# Patient Record
Sex: FEMALE | Race: WHITE | URBAN - METROPOLITAN AREA
[De-identification: names, ages, dates, MRNs, and addresses within clinical notes are randomized per-mention and may not be internally consistent; named-entity substitution may affect disease eponyms.]

---

## 2017-02-20 ENCOUNTER — OFFICE VISIT (OUTPATIENT)
Dept: DERMATOLOGY | Age: 52
End: 2017-02-20

## 2017-02-20 DIAGNOSIS — D22.9 MULTIPLE NEVI: ICD-10-CM

## 2017-02-20 DIAGNOSIS — D48.5 NEOPLASM OF UNCERTAIN BEHAVIOR OF SKIN: ICD-10-CM

## 2017-02-20 DIAGNOSIS — L57.0 AK (ACTINIC KERATOSIS): ICD-10-CM

## 2017-02-20 DIAGNOSIS — Z85.828 HISTORY OF SKIN CANCER: Primary | ICD-10-CM

## 2017-02-20 PROCEDURE — 17000 DESTRUCT PREMALG LESION: CPT | Performed by: DERMATOLOGY

## 2017-02-20 PROCEDURE — 11100 PR BIOPSY OF SKIN LESION: CPT | Performed by: DERMATOLOGY

## 2017-02-20 PROCEDURE — 99213 OFFICE O/P EST LOW 20 MIN: CPT | Performed by: DERMATOLOGY

## 2017-02-20 RX ORDER — ESTRADIOL AND NORETHINDRONE ACETATE 1; .5 MG/1; MG/1
TABLET ORAL
COMMUNITY
Start: 2017-02-04 | End: 2017-12-28

## 2017-03-01 ENCOUNTER — TELEPHONE (OUTPATIENT)
Dept: DERMATOLOGY | Age: 52
End: 2017-03-01

## 2017-03-01 DIAGNOSIS — C44.91 BCC (BASAL CELL CARCINOMA): Primary | ICD-10-CM

## 2017-03-24 ENCOUNTER — OFFICE VISIT (OUTPATIENT)
Dept: INTERNAL MEDICINE CLINIC | Age: 52
End: 2017-03-24

## 2017-03-24 VITALS
TEMPERATURE: 98 F | DIASTOLIC BLOOD PRESSURE: 80 MMHG | SYSTOLIC BLOOD PRESSURE: 112 MMHG | WEIGHT: 146 LBS | HEART RATE: 76 BPM | HEIGHT: 62 IN | BODY MASS INDEX: 26.87 KG/M2

## 2017-03-24 DIAGNOSIS — H66.003 ACUTE SUPPURATIVE OTITIS MEDIA OF BOTH EARS WITHOUT SPONTANEOUS RUPTURE OF TYMPANIC MEMBRANES, RECURRENCE NOT SPECIFIED: Primary | ICD-10-CM

## 2017-03-24 PROCEDURE — 99213 OFFICE O/P EST LOW 20 MIN: CPT | Performed by: INTERNAL MEDICINE

## 2017-03-24 RX ORDER — AZITHROMYCIN 250 MG/1
TABLET, FILM COATED ORAL
Qty: 1 PACKET | Refills: 0 | Status: SHIPPED | OUTPATIENT
Start: 2017-03-24 | End: 2017-04-03

## 2017-03-27 ENCOUNTER — PROCEDURE VISIT (OUTPATIENT)
Dept: SURGERY | Age: 52
End: 2017-03-27

## 2017-03-27 VITALS
BODY MASS INDEX: 26.67 KG/M2 | TEMPERATURE: 97.9 F | OXYGEN SATURATION: 97 % | SYSTOLIC BLOOD PRESSURE: 133 MMHG | DIASTOLIC BLOOD PRESSURE: 88 MMHG | HEART RATE: 82 BPM | WEIGHT: 145.8 LBS

## 2017-03-27 DIAGNOSIS — C44.41 BCC (BASAL CELL CARCINOMA), SCALP/NECK: Primary | ICD-10-CM

## 2017-03-27 PROCEDURE — 13121 CMPLX RPR S/A/L 2.6-7.5 CM: CPT | Performed by: DERMATOLOGY

## 2017-03-27 PROCEDURE — 17312 MOHS ADDL STAGE: CPT | Performed by: DERMATOLOGY

## 2017-03-27 PROCEDURE — 17311 MOHS 1 STAGE H/N/HF/G: CPT | Performed by: DERMATOLOGY

## 2017-03-28 ENCOUNTER — TELEPHONE (OUTPATIENT)
Dept: SURGERY | Age: 52
End: 2017-03-28

## 2017-04-17 ENCOUNTER — NURSE ONLY (OUTPATIENT)
Dept: SURGERY | Age: 52
End: 2017-04-17

## 2017-04-17 DIAGNOSIS — Z48.02 VISIT FOR SUTURE REMOVAL: Primary | ICD-10-CM

## 2017-06-27 ENCOUNTER — OFFICE VISIT (OUTPATIENT)
Dept: INTERNAL MEDICINE CLINIC | Age: 52
End: 2017-06-27

## 2017-06-27 VITALS
HEART RATE: 64 BPM | DIASTOLIC BLOOD PRESSURE: 76 MMHG | HEIGHT: 62 IN | SYSTOLIC BLOOD PRESSURE: 120 MMHG | WEIGHT: 136 LBS | BODY MASS INDEX: 25.03 KG/M2

## 2017-06-27 DIAGNOSIS — I10 ESSENTIAL HYPERTENSION: Primary | ICD-10-CM

## 2017-06-27 PROCEDURE — 99213 OFFICE O/P EST LOW 20 MIN: CPT | Performed by: INTERNAL MEDICINE

## 2017-06-27 ASSESSMENT — PATIENT HEALTH QUESTIONNAIRE - PHQ9
1. LITTLE INTEREST OR PLEASURE IN DOING THINGS: 0
SUM OF ALL RESPONSES TO PHQ9 QUESTIONS 1 & 2: 0
2. FEELING DOWN, DEPRESSED OR HOPELESS: 0
SUM OF ALL RESPONSES TO PHQ QUESTIONS 1-9: 0

## 2017-10-23 ENCOUNTER — TELEPHONE (OUTPATIENT)
Dept: DERMATOLOGY | Age: 52
End: 2017-10-23

## 2017-10-26 ENCOUNTER — OFFICE VISIT (OUTPATIENT)
Dept: DERMATOLOGY | Age: 52
End: 2017-10-26

## 2017-10-26 ENCOUNTER — TELEPHONE (OUTPATIENT)
Dept: DERMATOLOGY | Age: 52
End: 2017-10-26

## 2017-10-26 DIAGNOSIS — L57.0 AK (ACTINIC KERATOSIS): ICD-10-CM

## 2017-10-26 DIAGNOSIS — D18.00 ANGIOMA: Primary | ICD-10-CM

## 2017-10-26 PROCEDURE — 17000 DESTRUCT PREMALG LESION: CPT | Performed by: DERMATOLOGY

## 2017-10-26 RX ORDER — VALACYCLOVIR HYDROCHLORIDE 1 G/1
2000 TABLET, FILM COATED ORAL PRN
Qty: 20 TABLET | Refills: 0 | Status: SHIPPED | OUTPATIENT
Start: 2017-10-26 | End: 2017-12-28

## 2017-10-26 NOTE — TELEPHONE ENCOUNTER
Pharm calling need directions on medication velacyclovir? for patient pls call back to discuss @ [phone]

## 2017-10-26 NOTE — TELEPHONE ENCOUNTER
Confirmed with [facility] that patient will take medication as needed and quantity is correct.

## 2017-12-28 ENCOUNTER — OFFICE VISIT (OUTPATIENT)
Dept: INTERNAL MEDICINE CLINIC | Age: 52
End: 2017-12-28

## 2017-12-28 VITALS
HEIGHT: 62 IN | WEIGHT: 129.4 LBS | HEART RATE: 76 BPM | BODY MASS INDEX: 23.81 KG/M2 | SYSTOLIC BLOOD PRESSURE: 114 MMHG | DIASTOLIC BLOOD PRESSURE: 76 MMHG

## 2017-12-28 DIAGNOSIS — E78.49 OTHER HYPERLIPIDEMIA: Primary | ICD-10-CM

## 2017-12-28 LAB
A/G RATIO: 1.8 (ref 1.1–2.2)
ALBUMIN SERPL-MCNC: 4.4 G/DL (ref 3.4–5)
ALP BLD-CCNC: 85 U/L (ref 40–129)
ALT SERPL-CCNC: 28 U/L (ref 10–40)
ANION GAP SERPL CALCULATED.3IONS-SCNC: 17 MMOL/L (ref 3–16)
AST SERPL-CCNC: 25 U/L (ref 15–37)
BILIRUB SERPL-MCNC: <0.2 MG/DL (ref 0–1)
BUN BLDV-MCNC: 16 MG/DL (ref 7–20)
CALCIUM SERPL-MCNC: 9.1 MG/DL (ref 8.3–10.6)
CHLORIDE BLD-SCNC: 105 MMOL/L (ref 99–110)
CHOLESTEROL, TOTAL: 226 MG/DL (ref 0–199)
CO2: 25 MMOL/L (ref 21–32)
CREAT SERPL-MCNC: 0.8 MG/DL (ref 0.6–1.1)
GFR AFRICAN AMERICAN: >60
GFR NON-AFRICAN AMERICAN: >60
GLOBULIN: 2.5 G/DL
GLUCOSE BLD-MCNC: 84 MG/DL (ref 70–99)
HDLC SERPL-MCNC: 60 MG/DL (ref 40–60)
LDL CHOLESTEROL CALCULATED: 150 MG/DL
POTASSIUM SERPL-SCNC: 4.2 MMOL/L (ref 3.5–5.1)
SODIUM BLD-SCNC: 147 MMOL/L (ref 136–145)
TOTAL PROTEIN: 6.9 G/DL (ref 6.4–8.2)
TRIGL SERPL-MCNC: 80 MG/DL (ref 0–150)
VLDLC SERPL CALC-MCNC: 16 MG/DL

## 2017-12-28 PROCEDURE — 99213 OFFICE O/P EST LOW 20 MIN: CPT | Performed by: INTERNAL MEDICINE

## 2017-12-28 NOTE — PROGRESS NOTES
Chief Complaint   Patient presents with    Hypertension    Discuss Labs     is fasting for [address] 46 y.o. female is here for follow-up of hyperlipidemia. She is watching her diet, she has stopped all prescription medications at this point in time. 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)    IBS (irritable bowel syndrome)     not medically treated    Skin cancer     Wears glasses     reading only           /76   Pulse 76   Ht 5' 2\" (1.575 m)   Wt 129 lb 6.4 oz (58.7 kg)   BMI 23.67 kg/m²     General Appearance:  Alert, cooperative, no distress, appears stated age   Head:  Normocephalic, without obvious abnormality, atraumatic   Neck: Supple, symmetrical, trachea midline, no adenopathy, thyroid: not enlarged, symmetric, no tenderness/mass/nodules, no carotid bruit or JVD   Lungs:   Clear to auscultation bilaterally, respirations unlabored   Chest Wall:  No tenderness or deformity   Heart:  Regular rate and rhythm, S1, S2 normal, no murmur, rub or gallop   Abdomen:   Soft, non-tender, bowel sounds active all four quadrants,  no masses, no organomegaly   Skin: Skin color, texture, turgor normal, no rashes or lesions   Lymph nodes: Cervical, supraclavicular  Adenopathy is absent   Neurologic: No focal neurological deficits noted       No components found for: CHLPL  Lab Results   Component Value Date    TRIG 101 12/27/2016    TRIG 135 07/14/2016    TRIG 111 07/26/2011     Lab Results   Component Value Date    HDL 53 12/27/2016    HDL 47 07/14/2016    HDL 62 07/26/2011     Lab Results   Component Value Date    LDLCALC 82 12/27/2016    LDLCALC 186 (H) 07/14/2016    LDLCALC 143 (H) 07/26/2011     Lab Results   Component Value Date    LABVLDL 20 12/27/2016    LABVLDL 27 07/14/2016     Lab Results   Component Value Date    CREATININE 0.8 12/27/2016       Impression and plan    1.  Other

## 2018-02-20 ENCOUNTER — OFFICE VISIT (OUTPATIENT)
Dept: DERMATOLOGY | Age: 53
End: 2018-02-20

## 2018-02-20 DIAGNOSIS — Z85.828 HISTORY OF NONMELANOMA SKIN CANCER: Primary | ICD-10-CM

## 2018-02-20 DIAGNOSIS — L57.0 AK (ACTINIC KERATOSIS): ICD-10-CM

## 2018-02-20 DIAGNOSIS — D18.00 ANGIOMA: ICD-10-CM

## 2018-02-20 DIAGNOSIS — D22.9 MULTIPLE NEVI: ICD-10-CM

## 2018-02-20 PROCEDURE — 99213 OFFICE O/P EST LOW 20 MIN: CPT | Performed by: DERMATOLOGY

## 2018-02-20 PROCEDURE — 17000 DESTRUCT PREMALG LESION: CPT | Performed by: DERMATOLOGY

## 2018-02-20 NOTE — PROGRESS NOTES
[facility] Dermatology  Woodie Snellen, MD  [address] [facility]  1965    48 y.o. female     Date of Visit: 2018    Chief Complaint: f/u skin cancer, lesions  Chief Complaint   Patient presents with    Skin Exam     Last seen:     for filler    History of Present Illness:    1. Hx of NMSC - here for full skin check,   s/p Mohs for lesion on the R cheek   S/p Mohs for [address] on the frontal scalp 3-2017  She has had no problems with previous sites and no new concerns. 2.  History of AK'sshe has a rough lesion on the L cheek/zygoma. 3. She has multiple asx pigmented lesions on the trunk and extremities, present for many years; no change in size/shape/color of any lesions; no bleeding lesions. 4. She notes a red lesion on the frontal scalp near her previous BCC. She had her first treatment with filler for her oral commissures and MM lines as well as the lips . She had no complications except for bruising and is very happy with improvement, remedios around her commissures/MM area. She might like to have her lips augmented more in the future. Hx of laser x 1 for asphalt tattoo on the upper lip with improvement. Hx of dermatitis on the chest - cleared with TAC. Review of Systems:  Gen: Feels well, good sense of health. Skin: No changing moles or lesions. Past Medical History, Family History, Surgical History, Medications and Allergies reviewed.     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)    IBS (irritable bowel syndrome)     not medically treated    Skin cancer     Wears glasses     reading only       Past Surgical History:   Procedure Laterality Date    BREAST ENHANCEMENT SURGERY Bilateral      SECTION      times 3    SKIN BIOPSY      basal cell removed    TUBAL LIGATION      WISDOM TOOTH EXTRACTION      WRIST FRACTURE SURGERY Left 1/22/15    Open reduction & internal fixation of Left distal radius fracture - 2 components & Screw Fixation of left scaphoid       No outpatient prescriptions have been marked as taking for the 2/20/18 encounter (Office Visit) with Marvel Oseguera MD.       Allergies   Allergen Reactions    Cephalexin Rash    Sulfa Antibiotics Rash         Physical Examination     Gen, NAD  The following were examined and determined to be normal: Psych/Neuro, Conjunctivae/eyelids, Gums/teeth/lips, Neck, Abdomen, RUE, LUE, RLE, LLE, Nails/digits and Groin/buttocks. and Breast/axilla/chest, back. Scalp   The following were examined and determined to be abnormal: face  R cheek scar and others clear  L zygoma with erythematous roughly scaled macule   trunk and extremities with scattered brown macules and papules - photo of the L breast and back in media  Frontal scalp with 2 tiny 1 mm red macules                Assessment and Plan     1. Hx of NMSC, no signs recurrence  - educ re ABCD's of MM   educ sun protection   encouraged skin check yearly (sooner if indicated), self checks     2. AK - L zygoma - 1  - lesion(s) on the above areas treated with liquid nitrogen x 2 cycles. Patient educated on risk of blister, hypopigmentation/scar and wound care. 3.  Benign-appearing nevi  - educ sun protection   - encouraged skin check yearly (sooner if indicated), self checks    4. Few tiny angiomas - frontal scalp  - reassured    S/p filler at previous visit - very happy with results and would like lips augmented more in the future.

## 2018-02-20 NOTE — PATIENT INSTRUCTIONS
reapply sunscreen about every 2 hours and after swimming or sweating. · Wear sun protective clothing. Swim shirts (aka. rash guards) are a great idea and negates the need to reapply sunscreen in those areas.      · Seek the shade whenever possible especially between the hours of 10 am and 4 pm when the suns rays are the strongest.     · Avoid tanning beds

## 2019-02-25 ENCOUNTER — OFFICE VISIT (OUTPATIENT)
Dept: DERMATOLOGY | Age: 54
End: 2019-02-25
Payer: COMMERCIAL

## 2019-02-25 DIAGNOSIS — Z85.828 HISTORY OF NONMELANOMA SKIN CANCER: Primary | ICD-10-CM

## 2019-02-25 DIAGNOSIS — D18.00 ANGIOMA: ICD-10-CM

## 2019-02-25 DIAGNOSIS — D22.9 MULTIPLE NEVI: ICD-10-CM

## 2019-02-25 DIAGNOSIS — Z87.2 HISTORY OF ACTINIC KERATOSES: ICD-10-CM

## 2019-02-25 PROCEDURE — 99213 OFFICE O/P EST LOW 20 MIN: CPT | Performed by: DERMATOLOGY

## 2020-01-21 ENCOUNTER — OFFICE VISIT (OUTPATIENT)
Dept: INTERNAL MEDICINE CLINIC | Age: 55
End: 2020-01-21
Payer: COMMERCIAL

## 2020-01-21 VITALS
BODY MASS INDEX: 24.84 KG/M2 | TEMPERATURE: 99.6 F | HEIGHT: 62 IN | WEIGHT: 135 LBS | SYSTOLIC BLOOD PRESSURE: 120 MMHG | HEART RATE: 64 BPM | DIASTOLIC BLOOD PRESSURE: 70 MMHG

## 2020-01-21 PROCEDURE — 99213 OFFICE O/P EST LOW 20 MIN: CPT | Performed by: INTERNAL MEDICINE

## 2020-01-21 RX ORDER — AZITHROMYCIN 250 MG/1
TABLET, FILM COATED ORAL
Qty: 1 PACKET | Refills: 0 | Status: SHIPPED | OUTPATIENT
Start: 2020-01-21 | End: 2020-01-31

## 2020-01-21 RX ORDER — VALACYCLOVIR HYDROCHLORIDE 1 G/1
2000 TABLET, FILM COATED ORAL PRN
Qty: 20 TABLET | Refills: 0 | Status: SHIPPED | OUTPATIENT
Start: 2020-01-21 | End: 2022-06-23

## 2020-02-25 ENCOUNTER — OFFICE VISIT (OUTPATIENT)
Dept: DERMATOLOGY | Age: 55
End: 2020-02-25
Payer: COMMERCIAL

## 2020-02-25 PROCEDURE — 99213 OFFICE O/P EST LOW 20 MIN: CPT | Performed by: DERMATOLOGY

## 2020-02-25 PROCEDURE — 11102 TANGNTL BX SKIN SINGLE LES: CPT | Performed by: DERMATOLOGY

## 2020-02-27 LAB — DERMATOLOGY PATHOLOGY REPORT: ABNORMAL

## 2020-03-05 ENCOUNTER — TELEPHONE (OUTPATIENT)
Dept: DERMATOLOGY | Age: 55
End: 2020-03-05

## 2020-06-09 ENCOUNTER — TELEPHONE (OUTPATIENT)
Dept: DERMATOLOGY | Age: 55
End: 2020-06-09

## 2020-06-22 ENCOUNTER — OFFICE VISIT (OUTPATIENT)
Dept: DERMATOLOGY | Age: 55
End: 2020-06-22
Payer: COMMERCIAL

## 2020-06-22 VITALS — TEMPERATURE: 98.1 F

## 2020-06-22 PROCEDURE — 17110 DESTRUCTION B9 LES UP TO 14: CPT | Performed by: DERMATOLOGY

## 2020-06-22 PROCEDURE — 11102 TANGNTL BX SKIN SINGLE LES: CPT | Performed by: DERMATOLOGY

## 2020-06-22 PROCEDURE — 17261 DSTRJ MAL LES T/A/L .6-1.0CM: CPT | Performed by: DERMATOLOGY

## 2020-06-22 NOTE — PATIENT INSTRUCTIONS
what you will do instead of smoking. Tell your family and friends that you are going to try to quit and on what date. Put together a list of phone numbers of friends and family members who can give you support when you feel you might break down and have a cigarette. Before your quit day, put all tobacco products, ashtrays, and lighters away. 3. Put your plan into action  When you wake up on your quit day, start using a nicotine replacement method if you had planned to do that. For the first few days after you quit, you may have some signs of nicotine withdrawal. You may feel restless and cranky. You may find it hard to think. You might need to change your dose of nicotine if these signs upset you. Do not smoke! If you feel like you want to smoke, call a friend or family member who has agreed to help you. Also, there might be someone in your doctor's office you can call. Put into action your plans for doing things other than smoking. For example, when you feel the urge to smoke, you might take a walk. Or, you might visit friends who do not smoke. It is best to stay away from places where you used to smoke. 4. If you return to smoking--  Quitting smoking is not easy. Many people have to try several times before they succeed. If you start smoking again, call your primary care doctor's office soon to talk about what happened. Think about what you can do to keep from smoking when you try to quit again. Part of this handout is provided by the [employer]. More information is available at the American Lung Association https://phillips.com/.  This information provides a general overview and may not apply to everyone. Talk to your primary care doctor to find out if this information applies to you and to get more information on this subject. Biopsy Wound Care Instructions    · Keep the bandage in place for 24 hours.    · Cleanse the wound with mild soapy water

## 2020-06-24 LAB — DERMATOLOGY PATHOLOGY REPORT: NORMAL

## 2020-06-25 ENCOUNTER — TELEPHONE (OUTPATIENT)
Dept: DERMATOLOGY | Age: 55
End: 2020-06-25

## 2020-10-12 NOTE — TELEPHONE ENCOUNTER
[place] has a new spot on her scalp about 3-4 inches away from where she had a bcc removed. She would like to have Dr. Soumya Oviedo take a look at it. [de-identified] : rt throat pain\par mass rt tonsil large\par here for biopsy

## 2020-12-04 ENCOUNTER — HOSPITAL ENCOUNTER (OUTPATIENT)
Dept: WOMENS IMAGING | Age: 55
Discharge: HOME | End: 2020-12-04
Payer: COMMERCIAL

## 2020-12-04 PROCEDURE — 77067 SCR MAMMO BI INCL CAD: CPT

## 2021-03-09 ENCOUNTER — OFFICE VISIT (OUTPATIENT)
Dept: DERMATOLOGY | Age: 56
End: 2021-03-09
Payer: COMMERCIAL

## 2021-03-09 VITALS — TEMPERATURE: 97.6 F

## 2021-03-09 DIAGNOSIS — Z85.828 HISTORY OF NONMELANOMA SKIN CANCER: Primary | ICD-10-CM

## 2021-03-09 DIAGNOSIS — L57.0 AK (ACTINIC KERATOSIS): ICD-10-CM

## 2021-03-09 DIAGNOSIS — D22.9 MULTIPLE NEVI: ICD-10-CM

## 2021-03-09 DIAGNOSIS — D18.01 HEMANGIOMA OF SKIN: ICD-10-CM

## 2021-03-09 PROCEDURE — 99213 OFFICE O/P EST LOW 20 MIN: CPT | Performed by: DERMATOLOGY

## 2021-03-09 PROCEDURE — 17003 DESTRUCT PREMALG LES 2-14: CPT | Performed by: DERMATOLOGY

## 2021-03-09 PROCEDURE — 17000 DESTRUCT PREMALG LESION: CPT | Performed by: DERMATOLOGY

## 2021-03-09 NOTE — PROGRESS NOTES
[address] [facility] Dermatology  Elle Ruelas MD  [address] [facility]  1965    64 y.o. female     Date of Visit: 3/9/2021    Chief Complaint: f/u skin cancer, lesions  Chief Complaint   Patient presents with    Skin Lesion     FSE        HX:NMSC     Last seen:  6-2020   for filler    History of Present Illness:    1. Hx of NMSC - here for full skin check,   s/p Mohs for lesion on the R cheek   S/p Mohs for [address] on the frontal scalp 3-2017  L lateral lower shin - sup BCC -curettage 2020  She has had no problems with previous sites and no new concerns. 2.  History of AK's - few new concerns on the face and chest.    3. She has multiple asx pigmented lesions on the trunk and extremities, present for many years; no change in size/shape/color of any lesions; no bleeding lesions. 4. She has a few red lesions on the frontal scalp near her previous BCC. She had her first treatment with filler for her oral commissures and MM lines as well as the lips . She had no complications except for bruising and was very happy with improvement, remedios around her commissures/MM area. She might like to have her lips augmented more in the future. Hx of laser x 1 for asphalt tattoo on the upper lip with improvement. Hx of dermatitis on the chest - cleared with TAC. Review of Systems:  Gen: Feels well, good sense of health. Skin: No changing moles or lesions. Past Medical History, Family History, Surgical History, Medications and Allergies reviewed.     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)    IBS (irritable bowel syndrome)     not medically treated    Skin cancer     Wears glasses     reading only       Past Surgical History:   Procedure Laterality Date    BREAST ENHANCEMENT SURGERY Bilateral      SECTION      times 3    SKIN BIOPSY      basal cell removed    TUBAL LIGATION      WISDOM TOOTH EXTRACTION      WRIST FRACTURE SURGERY Left 1/22/15    Open reduction & internal fixation of Left distal radius fracture - 2 components & Screw Fixation of left scaphoid       Outpatient Medications Marked as Taking for the 3/9/21 encounter (Office Visit) with Miri Pabon MD   Medication Sig Dispense Refill    valACYclovir (VALTREX) 1 g tablet Take 2 tablets by mouth as needed (Take 2 tablets and 2 more tablets 12 hours later at start of cold sore) 20 tablet 0       Allergies   Allergen Reactions    Z-Jacob [Azithromycin] Hives and Itching    Cephalexin Rash    Sulfa Antibiotics Rash         Physical Examination     Gen, NAD  The following were examined and determined to be normal: Psych/Neuro, Conjunctivae/eyelids, Gums/teeth/lips, Neck, Abdomen, RUE, LUE, RLE, LLE, Nails/digits and Groin/buttocks. and Breast/axilla/chest, back. Scalp   The following were examined and determined to be abnormal: face  R cheek scar and others clear  Chest and face with erythematous roughly scaled macules  trunk and extremities with scattered brown macules and papules - photo of the L breast and back in media - stable  Frontal scalp with tiny 1 mm red macule  Scars clear                Assessment and Plan     1. Hx of NMSC, no signs recurrence  - educ re si/sx/ABCD's of    educ sun protection - OTC sunscreen with SPF 30-50+ recommended and reviewed usage  encouraged skin check yearly (sooner if indicated), self checks    2. AK - chest x 2 and face x 4  - 6 lesion(s) treated with liquid nitrogen x 2 cycles. Patient educated on risk of blister, hypopigmentation/scar and wound care. - cont sun protection    3.   Benign-appearing nevi  - educ re si/sx/ABCD's of MM   educ sun protection - OTC sunscreen with SPF 30-50+ recommended and reviewed usage  encouraged skin check yearly (sooner if indicated), self checks    4. tiny angiomas - frontal scalp  - reassured       S/p filler at previous visit - very happy with results and would like lips augmented more in the future. 500 -1000 depending on if 1 or 2 syringes for lip lines and MM.

## 2021-03-09 NOTE — PATIENT INSTRUCTIONS
For your well being, we encourage you to stop smoking or using tobacco products. Smoking and the use of other tobacco products, including cigars and smokeless tobacco, causes or worsens numerous diseases and conditions. Some products also expose nearby people to toxic secondhand smoke. Smoking is the leading cause of preventable death in the [place] causing over 438,000 deaths per year. Secondhand smoke is a serious health hazard for people of all ages, causing more than 41,000 deaths each year. Marijuana smoke contains many of the same toxins, irritants and carcinogens as tobacco smoke. Electronic cigarettes are a new tobacco product, and the potential health consequences and safety of these products are unknown. Smokeless Tobacco products are a known cause of cancer, and are not a safe alternative to cigarettes. 1. Make the decision to quit smoking  Stopping smoking is the best thing you can do for your health. If you smoke, you are more likely to get diseases of the lungs, heart, and brain. You are also more likely to get many kinds of cancer. After you quit, you will be less likely to get these diseases. Stopping smoking is hard--but you can do it! Your doctor can help you. 2. Get ready to quit  Once you decide to quit, make a plan with the help of your doctor. Here are some things you need to do:  Choose a day to quit. Talk to your primary care doctor about using the nicotine patch, gum, inhaler, or nasal spray to help you quit smoking. Getting nicotine some way other than in a cigarette can help make quitting easier. Talk to your primary care doctor about using a prescription medicine like bupropion (brand name: Zyban) to reduce your urge to smoke. If you decide to use a medicine, start taking it two weeks before your quit day. Talk with your primary care doctor about when you smoke. For example, you may smoke first thing in the morning, after a meal, or when you feel stressed.  Plan what you will do instead of smoking. Tell your family and friends that you are going to try to quit and on what date. Put together a list of phone numbers of friends and family members who can give you support when you feel you might break down and have a cigarette. Before your quit day, put all tobacco products, ashtrays, and lighters away. 3. Put your plan into action  When you wake up on your quit day, start using a nicotine replacement method if you had planned to do that. For the first few days after you quit, you may have some signs of nicotine withdrawal. You may feel restless and cranky. You may find it hard to think. You might need to change your dose of nicotine if these signs upset you. Do not smoke! If you feel like you want to smoke, call a friend or family member who has agreed to help you. Also, there might be someone in your doctor's office you can call. Put into action your plans for doing things other than smoking. For example, when you feel the urge to smoke, you might take a walk. Or, you might visit friends who do not smoke. It is best to stay away from places where you used to smoke. 4. If you return to smoking--  Quitting smoking is not easy. Many people have to try several times before they succeed. If you start smoking again, call your primary care doctor's office soon to talk about what happened. Think about what you can do to keep from smoking when you try to quit again. Part of this handout is provided by the [employer]. More information is available at the American Lung Association https://phillips.com/.  This information provides a general overview and may not apply to everyone. Talk to your primary care doctor to find out if this information applies to you and to get more information on this subject.       Cryosurgery (Freezing) Wound Care Instructions    AFTER THE PROCEDURE:    You will notice swelling and redness around the site. This is normal.    You may experience a sharp or sore feeling for the next several days. For this discomfort, you may take acetaminophen (Tylenol©).  A blister may develop at the treated area, sometimes as soon as by the end of the day. After several days, the blister will subside and a scab will form.  If the area is bumped or traumatized during the first few days following freezing, you may develop bleeding into the blister, forming a blood blister. This is nothing to be alarmed about.  If the blister is tense, uncomfortable, or much larger than the site that was frozen, you may pop the blister along its edge with a sterile needle (boiled, heated under a flame, or cleaned with alcohol) to allow the fluid to drain out. If the blister does not bother you, no treatment is needed.  Do NOT peel off the top of the blister roof. It will act as a dressing on top of your wound. WOUND CARE:    You may shower or bathe as usual, but avoid scrubbing the areas that have been frozen.  Cleanse the site twice a day with mild soapy water, and then apply a thin film of white petrolatum (Vaseline©).  You do not need to cover the area, but can if you prefer.  Do NOT allow the site to become dry or crusted, or attempt to dry it out with rubbing alcohol or hydrogen peroxide.  Continue this regimen until the area is pink and healed. Depending on the size and location of your cryosurgery site, healing may take 2 to 4 weeks.  The area may continue to be pink for several weeks, and over the next few months may become darker or lighter than the surrounding skin. This may be a permanent change. Protecting Yourself From the Sun    · Apply an over-the-counter broad spectrum water resistant sunscreen with an SPF of at least 30 to exposed areas of the skin. Dont forget the ears and lips! Remember to reapply sunscreen about every 2 hours and after swimming or sweating. · Wear sun protective clothing. Swim shirts (aka. rash guards) are a great idea and negates the need to reapply sunscreen in those areas.      · Seek the shade whenever possible especially between the hours of 10 am and 4 pm when the suns rays are the strongest.     · Avoid tanning beds  ·

## 2021-03-17 ENCOUNTER — TELEPHONE (OUTPATIENT)
Dept: INTERNAL MEDICINE CLINIC | Age: 56
End: 2021-03-17

## 2021-03-17 ENCOUNTER — NURSE TRIAGE (OUTPATIENT)
Dept: OTHER | Facility: CLINIC | Age: 56
End: 2021-03-17

## 2021-03-17 ENCOUNTER — VIRTUAL VISIT (OUTPATIENT)
Dept: INTERNAL MEDICINE CLINIC | Age: 56
End: 2021-03-17
Payer: COMMERCIAL

## 2021-03-17 DIAGNOSIS — J02.9 SORE THROAT: Primary | ICD-10-CM

## 2021-03-17 PROCEDURE — 87880 STREP A ASSAY W/OPTIC: CPT | Performed by: NURSE PRACTITIONER

## 2021-03-17 PROCEDURE — 99213 OFFICE O/P EST LOW 20 MIN: CPT | Performed by: NURSE PRACTITIONER

## 2021-03-17 ASSESSMENT — ENCOUNTER SYMPTOMS
WHEEZING: 0
RHINORRHEA: 1
SHORTNESS OF BREATH: 0
TROUBLE SWALLOWING: 0
SINUS PRESSURE: 0
DIARRHEA: 0
NAUSEA: 0
CONSTIPATION: 0
VOMITING: 0
ABDOMINAL PAIN: 0
SORE THROAT: 1
EYE PAIN: 0
COUGH: 0

## 2021-03-17 NOTE — TELEPHONE ENCOUNTER
Reason for Disposition   Earache also present    Answer Assessment - Initial Assessment Questions  1. ONSET: \"When did the throat start hurting? \" (Hours or days ago)       This morning    2. SEVERITY: \"How bad is the sore throat? \" (Scale 1-10; mild, moderate or severe)    - MILD (1-3):  doesn't interfere with eating or normal activities    - MODERATE (4-7): interferes with eating some solids and normal activities    - SEVERE (8-10):  excruciating pain, interferes with most normal activities    - SEVERE DYSPHAGIA: can't swallow liquids, drooling      Moderate    3. STREP EXPOSURE: \"Has there been any exposure to strep within the past week? \" If so, ask: \"What type of contact occurred? \"       No    4. VIRAL SYMPTOMS: Lou Gram there any symptoms of a cold, such as a runny nose, cough, hoarse voice or red eyes? \"   Ear congestion , runny nose, tickle in throat, hoarse    5. FEVER: \"Do you have a fever? \" If so, ask: \"What is your temperature, how was it measured, and when did it start? \"  No    6. PUS ON THE TONSILS: \"Is there pus on the tonsils in the back of your throat? \"  No    7. OTHER SYMPTOMS: \"Do you have any other symptoms? \" (e.g., difficulty breathing, headache, rash)  No    Protocols used: SORE THROAT-ADULT-[place]    Patient called Italo Luu at [facility])  with red flag complaint. Brief description of triage: sore throat    Triage indicates for patient to be seen today. If no appts available instructed to be seen at local urgent care. Care advice provided, patient verbalizes understanding; denies any other questions or concerns; instructed to call back for any new or worsening symptoms. Writer provided warm transfer to Jean Paul Franco at [facility] for appointment scheduling. Attention Provider: Thank you for allowing me to participate in the care of your patient. The patient was connected to triage in response to information provided to the [facility].   Please do not respond through this encounter as the response is not directed to a shared pool.

## 2021-03-17 NOTE — TELEPHONE ENCOUNTER
I can provide an order for a normal COVID test. According to the most recent information, rapid [facility] tests are being held for immediate need cases such as pre-op or admissions.

## 2021-03-17 NOTE — PROGRESS NOTES
TELEHEALTH EVALUATION -- Audio/Visual (During CSPIV-76 public health emergency)  Acute Office Visit  3/17/2021    SUBJECTIVE:    Patient ID: Juanita Rodriguez is a 64 y.o. female. Chief Complaint   Patient presents with    Pharyngitis     HPI: The patient presents for an acute visit. Pt reports a sore throat and ear pain that have been present since this morning. States she has a history of a right large tonsil. No history of strep throat. Started with nasal drainage yesterday. 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 Denies fevers or chills. Denies N/V, diarrhea or abdominal pain. Denies CP, SOB or wheezing. No cough. No trouble breathing. Treatment tried and response - tylenol with some relief  Recent ill contacts? Works as a teacher  Tobacco use or second hand smoke exposure - smoker  Condition better, worsening, or stable - stable    Allergies   Allergen Reactions    Z-Jacob [Azithromycin] Hives and Itching    Cephalexin Rash    Sulfa Antibiotics Rash     Current Outpatient Medications   Medication Sig Dispense Refill    valACYclovir (VALTREX) 1 g tablet Take 2 tablets by mouth as needed (Take 2 tablets and 2 more tablets 12 hours later at start of cold sore) 20 tablet 0     No current facility-administered medications for this visit. Review of Systems   Constitutional: Negative for appetite change, chills, diaphoresis, fatigue and fever. HENT: Positive for ear pain, rhinorrhea and sore throat. Negative for congestion, drooling, ear discharge, hearing loss, postnasal drip, sinus pressure, sneezing and trouble swallowing. Eyes: Negative for pain and visual disturbance. Respiratory: Negative for cough, shortness of breath and wheezing. Cardiovascular: Negative for chest pain and palpitations. Gastrointestinal: Negative for abdominal pain, constipation, diarrhea, nausea and vomiting. Skin: Negative for pallor.    Neurological: Negative for dizziness, light-headedness and headaches. OBJECTIVE:  Video Virtual Visit  Patient-Reported Vitals 3/17/2021   Patient-Reported Weight 140 lb   Patient-Reported Height 5'2''   Patient-Reported Temperature 96.9    ^no access to other VS d/t VV per pt. Physical Exam  Constitutional:       General: She is not in acute distress. Appearance: Normal appearance. She is not ill-appearing. Pulmonary:      Effort: Pulmonary effort is normal. No respiratory distress. Skin:     Coloration: Skin is not jaundiced or pale. Neurological:      Mental Status: She is alert. Comments: No facial asymmetry noted   Psychiatric:         Mood and Affect: Mood normal.      Due to this being a TeleHealth encounter, evaluation of the following organ systems is limited: Vitals/Constitutional/EENT/Resp/CV/GI//MS/Neuro/Skin/Heme-Lymph-Imm. ASSESSMENT/PLAN:  Rafael Hickey was seen today for pharyngitis. Diagnoses and all orders for this visit:    Sore throat  -     CDC guidelines reviewed. Recommend strep and Covid testing. Patient agreeable  - POCT rapid strep A  - Covid 19 testing  - MA scheduling pt at flu clinic.  - Quarantine recommended  - Symptomatic management reviewed. - Recommend patient call if symptoms worsen or fail to improve  - Red flag warning signs reviewed with the patient and she will go to the ER if these occur    Return for as previously scheduled or sooner if needed. Mariam Hay is a 64 y.o. female being evaluated by a Virtual Visit (video visit) encounter to address concerns as mentioned above. A caregiver was present when appropriate. Due to this being a TeleHealth encounter (During SINRI-43 public health emergency), evaluation of the following organ systems was limited: Vitals/Constitutional/EENT/Resp/CV/GI//MS/Neuro/Skin/Heme-Lymph-Imm.   Pursuant to the emergency declaration under the [facility] [place], [address] and

## 2021-03-17 NOTE — TELEPHONE ENCOUNTER
Patient states she went to [facility] and had covid 19 test & strep test and both are negative. She states the physician at urgent care is prescribing amoxicillin and steroid for her.

## 2021-03-17 NOTE — TELEPHONE ENCOUNTER
----- Message from [place] [employer] sent at 3/17/2021 11:36 AM EDT -----  Subject: Message to Provider    QUESTIONS  Information for Provider? Pt would like for the office to give her a call. Pt is in need of a rapid covid test. Pt just got off a with provider. Pt   needs a order for a rapid covid test.  ---------------------------------------------------------------------------  --------------  CALL BACK INFO  What is the best way for the office to contact you? OK to leave message on   voicemail  Preferred Call Back Phone Number? [phone]  ---------------------------------------------------------------------------  --------------  SCRIPT ANSWERS  Relationship to Patient?  Self

## 2021-03-24 ENCOUNTER — OFFICE VISIT (OUTPATIENT)
Dept: INTERNAL MEDICINE CLINIC | Age: 56
End: 2021-03-24
Payer: COMMERCIAL

## 2021-03-24 VITALS
TEMPERATURE: 97.2 F | DIASTOLIC BLOOD PRESSURE: 62 MMHG | BODY MASS INDEX: 26.5 KG/M2 | OXYGEN SATURATION: 97 % | SYSTOLIC BLOOD PRESSURE: 118 MMHG | WEIGHT: 144 LBS | HEART RATE: 88 BPM | HEIGHT: 62 IN

## 2021-03-24 DIAGNOSIS — Z76.89 ENCOUNTER TO ESTABLISH CARE WITH NEW DOCTOR: Primary | ICD-10-CM

## 2021-03-24 DIAGNOSIS — Z11.4 SCREENING FOR HIV (HUMAN IMMUNODEFICIENCY VIRUS): ICD-10-CM

## 2021-03-24 DIAGNOSIS — Z23 NEED FOR SHINGLES VACCINE: ICD-10-CM

## 2021-03-24 DIAGNOSIS — F17.200 SMOKER: ICD-10-CM

## 2021-03-24 DIAGNOSIS — E78.49 OTHER HYPERLIPIDEMIA: ICD-10-CM

## 2021-03-24 DIAGNOSIS — B00.1 COLD SORE: ICD-10-CM

## 2021-03-24 DIAGNOSIS — Z11.59 NEED FOR HEPATITIS C SCREENING TEST: ICD-10-CM

## 2021-03-24 PROCEDURE — 90471 IMMUNIZATION ADMIN: CPT | Performed by: NURSE PRACTITIONER

## 2021-03-24 PROCEDURE — 90750 HZV VACC RECOMBINANT IM: CPT | Performed by: NURSE PRACTITIONER

## 2021-03-24 PROCEDURE — 99396 PREV VISIT EST AGE 40-64: CPT | Performed by: NURSE PRACTITIONER

## 2021-03-24 RX ORDER — VARENICLINE TARTRATE
KIT
Qty: 1 BOX | Refills: 0 | Status: SHIPPED | OUTPATIENT
Start: 2021-03-24 | End: 2021-04-28

## 2021-03-24 SDOH — HEALTH STABILITY: MENTAL HEALTH: HOW OFTEN DO YOU HAVE A DRINK CONTAINING ALCOHOL?: NOT ASKED

## 2021-03-24 ASSESSMENT — ENCOUNTER SYMPTOMS
COUGH: 0
SINUS PAIN: 0
SHORTNESS OF BREATH: 0
WHEEZING: 0
CONSTIPATION: 0
DIARRHEA: 0
BACK PAIN: 0
SORE THROAT: 0
NAUSEA: 0
VOMITING: 0
BLOOD IN STOOL: 0
ABDOMINAL PAIN: 0

## 2021-03-24 NOTE — PATIENT INSTRUCTIONS
Please get your fasting lab work (no food or drink for 10-12 hours prior besides water) completed M-F [id number] at our office. [facility] lab has walk-in hours available as well - they are open Saturday 7a-3p - no appointment is needed. We will call with your results. Patient Education   varenicline  Pronunciation:  lucía bhakta Charla Lara  Brand:  Chantix  What is the most important information I should know about varenicline? When you stop smoking, you may have nicotine withdrawal symptoms with or without using medication such as varenicline. This includes feeling restless, depressed, angry, frustrated, or irritated. Stop taking varenicline and call your doctor if you have if you feel depressed, agitated, hostile, aggressive, or have thoughts about suicide or hurting yourself. Do not drink large amounts alcohol. Varenicline can increase the effects of alcohol or change the way you react to it. What is varenicline? Varenicline is a smoking cessation medicine. It is used together with behavior modification and counseling support to help you stop smoking. Varenicline may also be used for purposes not listed in this medication guide. What should I discuss with my health care provider before taking varenicline? You should not use varenicline if you used it in the past and had:  · a serious allergic reaction --trouble breathing, swelling in your face (lips, tongue, throat) or neck; or  · a serious skin reaction --blisters in your mouth, peeling skin rash. Tell your doctor if you have ever had:  · depression or mental illness;  · a seizure;  · kidney disease (or if you are on dialysis);  · heart or blood vessel problems; or  · if you drink alcohol. Tell your doctor if you are pregnant. It is not known whether varenicline will harm an unborn baby if you use the medicine during pregnancy. However, smoking while you are pregnant can harm the unborn baby or cause birth defects.    If you breast-feed while using this medicine, your baby may spit up or vomit more than normal, and may have a seizure. Varenicline is not approved for use by anyone younger than 25years old. How should I take varenicline? Follow all directions on your prescription label and read all medication guides or instruction sheets. Use the medicine exactly as directed. When you first start taking varenicline, you will take a low dose and then gradually increase it over the first several days of treatment. Take varenicline regularly to get the most benefit. You may choose from 3 ways to use varenicline. Ask your doctor which method is best for you:  · Set a date to quit smoking and start taking varenicline 1 week before that date. Make sure to quit smoking on your planned quit date. Take varenicline for a total of 12 weeks. · Start taking varenicline before you set a planned quit date, and choose a quit date that is between 8 and 35 days after you start treatment. Take varenicline for a total of 12 weeks. · Start taking varenicline and gradually reduce the number of cigarettes you smoke each day over a 12-week period, until you no longer smoke at all. Then take varenicline for another 12 weeks, for a total of 24 weeks. Take varenicline after eating. Take the medicine with a full glass of water. When you stop smoking, you may have nicotine withdrawal symptoms with or without using medication such as varenicline. Withdrawal symptoms include: increased appetite, weight gain, trouble sleeping, slower heart rate, feeling anxious or restless, and having the urge to smoke. Smoking cessation may also cause new or worsening mental health problems, such as depression. Stop taking varenicline and call your doctor if you have if you feel depressed, agitated, hostile, aggressive, or have thoughts about suicide or hurting yourself. Store at room temperature away from moisture and heat. What happens if I miss a dose?   Take the medicine as soon as you can, but skip the missed dose if it is almost time for your next dose. Do not take two doses at one time. Get your prescription refilled before you run out of medicine completely. What happens if I overdose? Seek emergency medical attention or call the Poison Help line at 1-711.335.7148. What should I avoid while taking varenicline? Do not drink large amounts alcohol while taking this medicine. Varenicline can increase the effects of alcohol or change the way you react to it. Some people taking varenicline have had unusual or aggressive behavior or forgetfulness while drinking alcohol. Do not use other medicines to quit smoking, unless your doctor tells you to. Using varenicline while wearing a nicotine patch can cause unpleasant side effects. Avoid driving or hazardous activity until you know how this medicine will affect you. Your reactions could be impaired. What are the possible side effects of varenicline? Get emergency medical help if you have signs of an allergic reaction (hives, difficult breathing, swelling in your face or throat) or a severe skin reaction (fever, sore throat, burning eyes, skin pain, red or purple skin rash with blistering and peeling). Stop using varenicline and call your doctor at once if you have:  · a seizure (convulsions);  · thoughts about suicide or hurting yourself;  · strange dreams, sleepwalking, trouble sleeping;  · new or worsening mental health problems --mood or behavior changes, depression, agitation, hostility, aggression;  · heart attack symptoms --chest pain or pressure, pain spreading to your jaw or shoulder, nausea, sweating; o  · stroke symptoms --sudden numbness or weakness (especially on one side of the body), slurred speech, problems with vision or balance. Your family or other caregivers should also be alert to changes in your mood or behavior.   Common side effects may include:  · nausea (may persist for several months), vomiting;  · constipation, gas;  · sleep problems (insomnia); or  · unusual dreams. This is not a complete list of side effects and others may occur. Call your doctor for medical advice about side effects. You may report side effects to FDA at 1-849-HCO-8732. What other drugs will affect varenicline? After you stop smoking, your doctor may need to adjust the doses of certain medicines you take on a regular basis. Tell your doctor about all your current medicines and any medicine you start or stop using. This includes prescription and over-the-counter medicines, vitamins, and herbal products. Not all possible interactions are listed here. Where can I get more information? Your pharmacist can provide more information about varenicline. Remember, keep this and all other medicines out of the reach of children, never share your medicines with others, and use this medication only for the indication prescribed. Every effort has been made to ensure that the information provided by [address] is accurate, up-to-date, and complete, but no guarantee is made to that effect. Drug information contained herein may be time sensitive. [facility] information has been compiled for use by healthcare practitioners and consumers in the [place] and therefore [employer] does not warrant that uses outside of the [place] are appropriate, unless specifically indicated otherwise. [facility]'s drug information does not endorse drugs, diagnose patients or recommend therapy. [employer] drug information is an informational resource designed to assist licensed healthcare practitioners in caring for their patients and/or to serve consumers viewing this service as a supplement to, and not a substitute for, the expertise, skill, knowledge and judgment of healthcare practitioners.  The absence of a warning for a given drug or drug combination in no way should be construed to indicate that the drug or drug combination is safe, effective or appropriate for any given patient. [facility] does not assume any responsibility for any aspect of healthcare administered with the aid of information [facility] provides. The information contained herein is not intended to cover all possible uses, directions, precautions, warnings, drug interactions, allergic reactions, or adverse effects. If you have questions about the drugs you are taking, check with your doctor, nurse or pharmacist.  Copyright 3580-9921 [address]. Version: 10.01. Revision date: 8/8/2018. Care instructions adapted under license by [facility] ([facility]). If you have questions about a medical condition or this instruction, always ask your healthcare professional. [place] [address] any warranty or liability for your use of this information.

## 2021-03-24 NOTE — PROGRESS NOTES
New Patient Office Visit  3/24/2021    Subjective:  Chief Complaint   Patient presents with    New Patient     establishing care     HPI:  Mariam Hay is a 64 y.o. female who presents to the clinic today to establish care. Hyperlipidemiapatient was taking Lipitor in the past but stopped this medication and was controlling cholesterol levels with lifestyle modifications. Has not been using gym a lot per pt- she states she has home gym equipment. Cold soreshas Valtrex as needed. Rare use. History of basal cell cancer - sees dermatology. Smoker- smoking 0.25 ppd. States this is d/t stress. Started again through divorce- states her  was a \"narcicisstic alcoholic. \" Quit with chantix in the past. She would like to restart this. Had the flu shot 09/2020 through work. Had both COVID vaccines- last one was 2/25/2021. 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 3 daughters. 4 grandchildren. . For fun, she enjoys riding a motorcycle. Lives in [place]. 2 puppies. Review of Systems   Constitutional: Negative for chills, fatigue, fever and unexpected weight change. HENT: Negative for congestion, postnasal drip, sinus pain, sore throat and tinnitus. Respiratory: Negative for cough, shortness of breath and wheezing. Cardiovascular: Negative for chest pain, palpitations and leg swelling. Gastrointestinal: Negative for abdominal pain, blood in stool, constipation, diarrhea, nausea and vomiting. Genitourinary: Negative for dysuria, frequency, hematuria and urgency. Musculoskeletal: Negative for back pain, gait problem, myalgias and neck pain. Skin: Negative for pallor and rash. Neurological: Negative for dizziness, weakness, light-headedness, numbness and headaches. Psychiatric/Behavioral: Negative for behavioral problems, confusion, dysphoric mood, sleep disturbance and suicidal ideas. The patient is not nervous/anxious.       Allergies Allergen Reactions    Z-Jacob [Azithromycin] Hives and Itching    Cephalexin Rash    Sulfa Antibiotics Rash     Family History   Problem Relation Age of Onset    Osteoporosis Mother     High Blood Pressure Mother     Breast Cancer Mother     Cancer Mother         bladder with pelvic mets    Cirrhosis Father         related to alcohol    Lung Cancer Father     Cataracts Father     Stroke Maternal Grandfather     Stroke Maternal Uncle     Ovarian Cancer Neg Hx     Heart Attack Neg Hx      Current Outpatient Rx   Medication Sig Dispense Refill    varenicline (CHANTIX STARTING MONTH ) 0.5 MG X 11 & 1 MG X 42 tablet Take by mouth.  1 box 0    valACYclovir (VALTREX) 1 g tablet Take 2 tablets by mouth as needed (Take 2 tablets and 2 more tablets 12 hours later at start of cold sore) 20 tablet 0     Social History     Socioeconomic History    Marital status:      Spouse name: Not on file    Number of children: Not on file    Years of education: Not on file    Highest education level: Not on file   Occupational History    Not on file   Social Needs    Financial resource strain: Not hard at all   [employer] insecurity     Worry: Never true     Inability: Never true   [place] needs     Medical: No     Non-medical: No   Tobacco Use    Smoking status: Current Every Day Smoker     Packs/day: 0.25     Types: Cigarettes     Last attempt to quit: 2010     Years since quittin.2    Smokeless tobacco: Never Used    Tobacco comment: quit:  4 years ago   Substance and Sexual Activity    Alcohol use: Yes     Comment: 5-6 drinks per week    Drug use: No    Sexual activity: Yes     Partners: Male   Lifestyle    Physical activity     Days per week: Not on file     Minutes per session: Not on file    Stress: Not on file   Relationships    Social connections     Talks on phone: Not on file     Gets together: Not on file     Attends Faith service: Not on file     Active member of club or organization: Not on file     Attends meetings of clubs or organizations: Not on file     Relationship status: Not on file    Intimate partner violence     Fear of current or ex partner: Not on file     Emotionally abused: Not on file     Physically abused: Not on file     Forced sexual activity: Not on file   Other Topics Concern    Not on file   Social History Narrative    Works as a teacher at [employer]. 3 daughters. 4 grandchildren. . For fun, she enjoys riding a motorcycle. Lives in [place]. 2 puppies. 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)    IBS (irritable bowel syndrome)     not medically treated    Other hyperlipidemia     Wears glasses     reading only     Patient Active Problem List   Diagnosis    IBS (irritable bowel syndrome)    Depression    Fracture of radius    Scaphoid fracture of wrist    Ulna fracture    Visit for suture removal    Other hyperlipidemia    Cold sore      Wt Readings from Last 3 Encounters:   03/24/21 144 lb (65.3 kg)   01/21/20 135 lb (61.2 kg)   12/28/17 129 lb 6.4 oz (58.7 kg)     BP Readings from Last 3 Encounters:   03/24/21 118/62   01/21/20 120/70   12/28/17 114/76     The ASCVD Risk score (Ladonna Angulo, et al., 2013) failed to calculate for the following reasons:    Cannot find a previous HDL lab    Cannot find a previous total cholesterol lab  ^ordered today    Objective/Physical Exam:  /62   Pulse 88   Temp 97.2 °F (36.2 °C)   Ht 5' 2\" (1.575 m)   Wt 144 lb (65.3 kg)   SpO2 97%   BMI 26.34 kg/m²   Body mass index is 26.34 kg/m². Physical Exam  Vitals signs reviewed. Constitutional:       General: She is not in acute distress. Appearance: She is well-developed. She is not diaphoretic. HENT:      Head: Normocephalic and atraumatic.       Right Ear: Tympanic membrane and external ear normal.      Left Ear: Tympanic membrane and external ear normal.   Eyes:      Conjunctiva/sclera: Conjunctivae normal.      Pupils: Pupils are equal, round, and reactive to light. Cardiovascular:      Rate and Rhythm: Normal rate and regular rhythm. Pulmonary:      Effort: Pulmonary effort is normal. No respiratory distress. Breath sounds: Normal breath sounds. No wheezing or rales. Chest:      Chest wall: No tenderness. Abdominal:      General: Bowel sounds are normal. There is no distension. Palpations: Abdomen is soft. Tenderness: There is no abdominal tenderness. There is no guarding. Musculoskeletal: Normal range of motion. General: No tenderness or deformity. Skin:     General: Skin is warm and dry. Coloration: Skin is not pale. Findings: No erythema or rash. Neurological:      Mental Status: She is alert and oriented to person, place, and time. Coordination: Coordination normal.   Psychiatric:         Mood and Affect: Mood normal.       Assessment and Plan:  [place] was seen today for new patient. Diagnoses and all orders for this visit:    Encounter to establish care with new doctor  -     COMPREHENSIVE METABOLIC PANEL; Future  -     CBC Auto Differential; Future  - Lifestyle modifications such as exercise, weight loss and healthy diet encouraged and reviewed with the pt. Other hyperlipidemia  -    Using lifestyle modifications. Will reevaluate  - Lipid, Fasting; Future    Cold sore   - Denies the need for refill. Rare use. Screening for HIV (human immunodeficiency virus)  -    Asymptomatic. Patient agreeable  - HIV Screen; Future    Need for hepatitis C screening test  -    Asymptomatic. Patient agreeable  - HEPATITIS C ANTIBODY; Future    Shingles vaccine   - Pt reports that it has been >14 since her COVID-19 vaccine   - Administered today - patient education handout provided and reviewed with the pt. Smoker  -    Cessation recommended. Patient reports she has quit in the past with Chantix and she would like to try this again. - varenicline (CHANTIX STARTING MONTH MELA) 0.5 MG X 11 & 1 MG X 42 tablet; Take by mouth. - patient education handout provided and reviewed with the pt. Return for 4-6 weeks smoking cessation f/u, or sooner if needed. Pt will call if symptoms worsen or fail to improve. All questions answered. Pt states no further questions or concerns at this time.    Electronically signed by: Geraldine Pizano 03/24/21

## 2021-03-29 DIAGNOSIS — Z76.89 ENCOUNTER TO ESTABLISH CARE WITH NEW DOCTOR: ICD-10-CM

## 2021-03-29 DIAGNOSIS — Z11.59 NEED FOR HEPATITIS C SCREENING TEST: ICD-10-CM

## 2021-03-29 DIAGNOSIS — E78.49 OTHER HYPERLIPIDEMIA: ICD-10-CM

## 2021-03-29 DIAGNOSIS — Z11.4 SCREENING FOR HIV (HUMAN IMMUNODEFICIENCY VIRUS): ICD-10-CM

## 2021-03-29 LAB
A/G RATIO: 1.7 (ref 1.1–2.2)
ALBUMIN SERPL-MCNC: 4.7 G/DL (ref 3.4–5)
ALP BLD-CCNC: 94 U/L (ref 40–129)
ALT SERPL-CCNC: 17 U/L (ref 10–40)
ANION GAP SERPL CALCULATED.3IONS-SCNC: 11 MMOL/L (ref 3–16)
AST SERPL-CCNC: 24 U/L (ref 15–37)
BASOPHILS ABSOLUTE: 0.1 K/UL (ref 0–0.2)
BASOPHILS RELATIVE PERCENT: 1.3 %
BILIRUB SERPL-MCNC: 0.3 MG/DL (ref 0–1)
BUN BLDV-MCNC: 15 MG/DL (ref 7–20)
CALCIUM SERPL-MCNC: 9.5 MG/DL (ref 8.3–10.6)
CHLORIDE BLD-SCNC: 102 MMOL/L (ref 99–110)
CHOLESTEROL, FASTING: 263 MG/DL (ref 0–199)
CO2: 28 MMOL/L (ref 21–32)
CREAT SERPL-MCNC: 0.8 MG/DL (ref 0.6–1.1)
EOSINOPHILS ABSOLUTE: 0.2 K/UL (ref 0–0.6)
EOSINOPHILS RELATIVE PERCENT: 4.7 %
GFR AFRICAN AMERICAN: >60
GFR NON-AFRICAN AMERICAN: >60
GLOBULIN: 2.7 G/DL
GLUCOSE BLD-MCNC: 93 MG/DL (ref 70–99)
HCT VFR BLD CALC: 41.8 % (ref 36–48)
HDLC SERPL-MCNC: 56 MG/DL (ref 40–60)
HEMOGLOBIN: 14.4 G/DL (ref 12–16)
HEPATITIS C ANTIBODY INTERPRETATION: NORMAL
LDL CHOLESTEROL CALCULATED: 176 MG/DL
LYMPHOCYTES ABSOLUTE: 1.3 K/UL (ref 1–5.1)
LYMPHOCYTES RELATIVE PERCENT: 29.3 %
MCH RBC QN AUTO: 30.8 PG (ref 26–34)
MCHC RBC AUTO-ENTMCNC: 34.4 G/DL (ref 31–36)
MCV RBC AUTO: 89.7 FL (ref 80–100)
MONOCYTES ABSOLUTE: 0.3 K/UL (ref 0–1.3)
MONOCYTES RELATIVE PERCENT: 7.5 %
NEUTROPHILS ABSOLUTE: 2.5 K/UL (ref 1.7–7.7)
NEUTROPHILS RELATIVE PERCENT: 57.2 %
PDW BLD-RTO: 12.9 % (ref 12.4–15.4)
PLATELET # BLD: 262 K/UL (ref 135–450)
PMV BLD AUTO: 6.9 FL (ref 5–10.5)
POTASSIUM SERPL-SCNC: 4.3 MMOL/L (ref 3.5–5.1)
RBC # BLD: 4.66 M/UL (ref 4–5.2)
SODIUM BLD-SCNC: 141 MMOL/L (ref 136–145)
TOTAL PROTEIN: 7.4 G/DL (ref 6.4–8.2)
TRIGLYCERIDE, FASTING: 157 MG/DL (ref 0–150)
VLDLC SERPL CALC-MCNC: 31 MG/DL
WBC # BLD: 4.3 K/UL (ref 4–11)

## 2021-03-30 LAB
HIV AG/AB: NORMAL
HIV ANTIGEN: NORMAL
HIV-1 ANTIBODY: NORMAL
HIV-2 AB: NORMAL

## 2021-04-28 ENCOUNTER — OFFICE VISIT (OUTPATIENT)
Dept: INTERNAL MEDICINE CLINIC | Age: 56
End: 2021-04-28
Payer: COMMERCIAL

## 2021-04-28 VITALS
BODY MASS INDEX: 25.24 KG/M2 | OXYGEN SATURATION: 99 % | DIASTOLIC BLOOD PRESSURE: 80 MMHG | HEART RATE: 90 BPM | SYSTOLIC BLOOD PRESSURE: 132 MMHG | WEIGHT: 138 LBS

## 2021-04-28 DIAGNOSIS — Z87.891 FORMER SMOKER: Primary | ICD-10-CM

## 2021-04-28 PROCEDURE — 99212 OFFICE O/P EST SF 10 MIN: CPT | Performed by: NURSE PRACTITIONER

## 2021-04-28 RX ORDER — VARENICLINE TARTRATE 1 MG/1
1 TABLET, FILM COATED ORAL 2 TIMES DAILY
Qty: 60 TABLET | Refills: 1 | Status: SHIPPED | OUTPATIENT
Start: 2021-04-28 | End: 2021-06-29 | Stop reason: SDUPTHER

## 2021-04-28 ASSESSMENT — ENCOUNTER SYMPTOMS
SHORTNESS OF BREATH: 0
WHEEZING: 0
COUGH: 0

## 2021-04-28 NOTE — PROGRESS NOTES
Office Visit   4/28/2021    Subjective:  Chief Complaint   Patient presents with    Nicotine Dependence     pt has stopped smoking     HPI:   Zach Junior is a 64 y.o. female who presents to the clinic today for follow up. Smoker- Pt reports \"I am done. \" States she is taking Chantix- had nightmares at first but is sleeping better now. Last cigarette 2 weeks ago. States she feels better and is not needing naps like she used to. Started working out and eating healthier. Taking [employer] classes. Lost 6 pounds. Vitals 4/28/2021 3/24/2021   Weight 138 lb 144 lb     Review of Systems   Constitutional: Negative for chills, fatigue, fever and unexpected weight change. Eyes: Negative for visual disturbance. Respiratory: Negative for cough, shortness of breath and wheezing. Cardiovascular: Negative for chest pain, palpitations and leg swelling. Skin: Negative for pallor and rash.      Allergies   Allergen Reactions    Z-Jacob [Azithromycin] Hives and Itching    Cephalexin Rash    Sulfa Antibiotics Rash     Current Outpatient Rx   Medication Sig Dispense Refill    varenicline (CHANTIX) 1 MG tablet Take 1 tablet by mouth 2 times daily 60 tablet 1    valACYclovir (VALTREX) 1 g tablet Take 2 tablets by mouth as needed (Take 2 tablets and 2 more tablets 12 hours later at start of cold sore) 20 tablet 0     Patient Active Problem List   Diagnosis    IBS (irritable bowel syndrome)    Depression    Fracture of radius    Scaphoid fracture of wrist    Ulna fracture    Visit for suture removal    Other hyperlipidemia    Cold sore      Wt Readings from Last 3 Encounters:   04/28/21 138 lb (62.6 kg)   03/24/21 144 lb (65.3 kg)   01/21/20 135 lb (61.2 kg)     BP Readings from Last 3 Encounters:   04/28/21 132/80   03/24/21 118/62   01/21/20 120/70     The 10-year ASCVD risk score (Gilman Aase., et al., 2013) is: 3%    Values used to calculate the score:      Age: 64 years      Sex: Female      Is Non- : No      Diabetic: No      Tobacco smoker: No      Systolic Blood Pressure: 151 mmHg      Is BP treated: No      HDL Cholesterol: 56 mg/dL      Total Cholesterol: 263 mg/dL    Objective/Physical Exam:  /80   Pulse 90   Wt 138 lb (62.6 kg)   SpO2 99%   BMI 25.24 kg/m²   Body mass index is 25.24 kg/m². Physical Exam  Vitals signs reviewed. Constitutional:       General: She is not in acute distress. Appearance: She is well-developed. She is not diaphoretic. HENT:      Head: Normocephalic and atraumatic. Cardiovascular:      Rate and Rhythm: Normal rate and regular rhythm. Pulmonary:      Effort: Pulmonary effort is normal. No respiratory distress. Breath sounds: Normal breath sounds. No wheezing or rales. Chest:      Chest wall: No tenderness. Skin:     General: Skin is warm and dry. Neurological:      Mental Status: She is alert and oriented to person, place, and time. Coordination: Coordination normal.   Psychiatric:         Mood and Affect: Mood normal.       Assessment and Plan:  Meri Diallo was seen today for nicotine dependence. Diagnoses and all orders for this visit:    Former smoker  -     Stop smoking 2 weeks ago. Reports side effects have improved. She has much more energy per patient. - Congratulated on smoking cessation. Evidence reviewed. Patient agreeable to continue Chantix for 2 more months.  - varenicline (CHANTIX) 1 MG tablet; Take 1 tablet by mouth 2 times daily-refilled today    Return in about 2 months (around 6/28/2021) for smoking cessation/shingles vaccine f/u, or sooner if needed. Pt will call if symptoms worsen or fail to improve. All questions answered. Pt states no further questions or concerns at this time.    Electronically signed by: Anamika Ackerman 04/28/21

## 2021-06-29 ENCOUNTER — OFFICE VISIT (OUTPATIENT)
Dept: INTERNAL MEDICINE CLINIC | Age: 56
End: 2021-06-29
Payer: COMMERCIAL

## 2021-06-29 VITALS
HEART RATE: 86 BPM | BODY MASS INDEX: 24.84 KG/M2 | SYSTOLIC BLOOD PRESSURE: 120 MMHG | WEIGHT: 135.8 LBS | OXYGEN SATURATION: 99 % | DIASTOLIC BLOOD PRESSURE: 70 MMHG

## 2021-06-29 DIAGNOSIS — Z23 NEED FOR SHINGLES VACCINE: ICD-10-CM

## 2021-06-29 DIAGNOSIS — E78.49 OTHER HYPERLIPIDEMIA: ICD-10-CM

## 2021-06-29 DIAGNOSIS — Z87.891 FORMER SMOKER: Primary | ICD-10-CM

## 2021-06-29 PROCEDURE — 90471 IMMUNIZATION ADMIN: CPT | Performed by: NURSE PRACTITIONER

## 2021-06-29 PROCEDURE — 99213 OFFICE O/P EST LOW 20 MIN: CPT | Performed by: NURSE PRACTITIONER

## 2021-06-29 PROCEDURE — 90750 HZV VACC RECOMBINANT IM: CPT | Performed by: NURSE PRACTITIONER

## 2021-06-29 RX ORDER — VARENICLINE TARTRATE 1 MG/1
1 TABLET, FILM COATED ORAL DAILY
Qty: 90 TABLET | Refills: 0 | Status: SHIPPED
Start: 2021-06-29 | End: 2022-03-01

## 2021-06-29 ASSESSMENT — ENCOUNTER SYMPTOMS
COUGH: 0
WHEEZING: 0
SHORTNESS OF BREATH: 0

## 2021-06-29 NOTE — PROGRESS NOTES
fracture    Visit for suture removal    Other hyperlipidemia    Cold sore      Wt Readings from Last 3 Encounters:   06/29/21 135 lb 12.8 oz (61.6 kg)   04/28/21 138 lb (62.6 kg)   03/24/21 144 lb (65.3 kg)     BP Readings from Last 3 Encounters:   06/29/21 120/70   04/28/21 132/80   03/24/21 118/62     The 10-year ASCVD risk score (Sydney Clements, et al., 2013) is: 2.5%    Values used to calculate the score:      Age: 64 years      Sex: Female      Is Non- : No      Diabetic: No      Tobacco smoker: No      Systolic Blood Pressure: 273 mmHg      Is BP treated: No      HDL Cholesterol: 56 mg/dL      Total Cholesterol: 263 mg/dL    Objective/Physical Exam:  /70   Pulse 86   Wt 135 lb 12.8 oz (61.6 kg)   SpO2 99%   BMI 24.84 kg/m²   Body mass index is 24.84 kg/m². Physical Exam  Vitals reviewed. Constitutional:       General: She is not in acute distress. Appearance: She is well-developed. She is not diaphoretic. HENT:      Head: Normocephalic and atraumatic. Cardiovascular:      Rate and Rhythm: Normal rate and regular rhythm. Pulmonary:      Effort: Pulmonary effort is normal. No respiratory distress. Breath sounds: Normal breath sounds. No wheezing or rales. Chest:      Chest wall: No tenderness. Skin:     General: Skin is warm and dry. Neurological:      Mental Status: She is alert and oriented to person, place, and time. Coordination: Coordination normal.       Assessment and Plan:  [place] was seen today for nicotine dependence and immunizations. Diagnoses and all orders for this visit:    Former smoker  -    Lungs clear to auscultation. Oxygen saturation 99% on room air  - Recommend complete smoking cessation.  - Recommend pt continue chantix for 3 months. Pt agreeable. She would like to continue this at once daily instead of BID.   - varenicline (CHANTIX) 1 MG tablet;  Take 1 tablet by mouth daily- refilled today    Other

## 2021-07-23 ENCOUNTER — TELEPHONE (OUTPATIENT)
Dept: INTERNAL MEDICINE CLINIC | Age: 56
End: 2021-07-23

## 2021-07-23 NOTE — TELEPHONE ENCOUNTER
Vivek Ness with [address] [facility] called to say Chantix 1 MG is on back order/ recall and they do not have . 5 MG either.      Kajal Fowler [address] [facility] [place]

## 2021-07-27 NOTE — TELEPHONE ENCOUNTER
Pt states she has not been taking the chantix recently. Says she has been doing great. Said she wonders why it has been recalled. Is okay with not taking it for now.

## 2021-08-26 ENCOUNTER — TELEPHONE (OUTPATIENT)
Dept: INTERNAL MEDICINE CLINIC | Age: 56
End: 2021-08-26

## 2021-08-26 NOTE — TELEPHONE ENCOUNTER
Pt reports headache, sore throat and HWANG. Went to urgent care and got tested- states rapid COVID-19 test was positive. Symptoms for a few days. Pt was vaccinated. Pt quarantining. States symptoms stable. Received medications for symptomatic management via urgent care. States she is no longer using chantix and she is not smoking. Patient will call if symptoms worsen. Red flag warning signs reviewed with the patient and she will go to the ER if these occur    All questions answered. Patient states no further questions or concerns at this time.     Electronically signed by: HORTENCIA Shrestha CNP 08/26/21

## 2021-08-26 NOTE — TELEPHONE ENCOUNTER
Patient called to see if she should get a covid test from the lab. She had a rapid test a few days ago that came back positive and was given steroids. She had another test done yesterday and it says positive but not sure if these tests are accurate. Please call and advise.

## 2022-02-18 ENCOUNTER — HOSPITAL ENCOUNTER (OUTPATIENT)
Dept: WOMENS IMAGING | Age: 57
Discharge: HOME | End: 2022-02-18
Payer: COMMERCIAL

## 2022-02-18 VITALS — HEIGHT: 62 IN | BODY MASS INDEX: 25.76 KG/M2 | WEIGHT: 140 LBS

## 2022-02-18 DIAGNOSIS — Z12.31 BREAST CANCER SCREENING BY MAMMOGRAM: ICD-10-CM

## 2022-02-18 PROCEDURE — 77063 BREAST TOMOSYNTHESIS BI: CPT

## 2022-02-21 LAB — HPV, HIGH RISK: NOT DETECTED

## 2022-03-01 ENCOUNTER — OFFICE VISIT (OUTPATIENT)
Dept: INTERNAL MEDICINE CLINIC | Age: 57
End: 2022-03-01
Payer: COMMERCIAL

## 2022-03-01 VITALS
OXYGEN SATURATION: 97 % | BODY MASS INDEX: 26.31 KG/M2 | SYSTOLIC BLOOD PRESSURE: 136 MMHG | HEART RATE: 70 BPM | HEIGHT: 62 IN | WEIGHT: 143 LBS | DIASTOLIC BLOOD PRESSURE: 80 MMHG

## 2022-03-01 DIAGNOSIS — B00.1 COLD SORE: ICD-10-CM

## 2022-03-01 DIAGNOSIS — Z71.85 VACCINE COUNSELING: ICD-10-CM

## 2022-03-01 DIAGNOSIS — E78.49 OTHER HYPERLIPIDEMIA: ICD-10-CM

## 2022-03-01 DIAGNOSIS — Z00.00 ANNUAL PHYSICAL EXAM: Primary | ICD-10-CM

## 2022-03-01 DIAGNOSIS — Z87.891 FORMER SMOKER: ICD-10-CM

## 2022-03-01 PROCEDURE — 99396 PREV VISIT EST AGE 40-64: CPT | Performed by: NURSE PRACTITIONER

## 2022-03-01 ASSESSMENT — ENCOUNTER SYMPTOMS
ABDOMINAL PAIN: 0
WHEEZING: 0
CONSTIPATION: 0
SINUS PAIN: 0
NAUSEA: 0
SORE THROAT: 0
VOMITING: 0
SHORTNESS OF BREATH: 0
DIARRHEA: 0
COUGH: 0

## 2022-03-01 ASSESSMENT — PATIENT HEALTH QUESTIONNAIRE - PHQ9
SUM OF ALL RESPONSES TO PHQ QUESTIONS 1-9: 1
9. THOUGHTS THAT YOU WOULD BE BETTER OFF DEAD, OR OF HURTING YOURSELF: 0
SUM OF ALL RESPONSES TO PHQ QUESTIONS 1-9: 1
10. IF YOU CHECKED OFF ANY PROBLEMS, HOW DIFFICULT HAVE THESE PROBLEMS MADE IT FOR YOU TO DO YOUR WORK, TAKE CARE OF THINGS AT HOME, OR GET ALONG WITH OTHER PEOPLE: 0
SUM OF ALL RESPONSES TO PHQ QUESTIONS 1-9: 1
8. MOVING OR SPEAKING SO SLOWLY THAT OTHER PEOPLE COULD HAVE NOTICED. OR THE OPPOSITE, BEING SO FIGETY OR RESTLESS THAT YOU HAVE BEEN MOVING AROUND A LOT MORE THAN USUAL: 0
1. LITTLE INTEREST OR PLEASURE IN DOING THINGS: 1
2. FEELING DOWN, DEPRESSED OR HOPELESS: 0
6. FEELING BAD ABOUT YOURSELF - OR THAT YOU ARE A FAILURE OR HAVE LET YOURSELF OR YOUR FAMILY DOWN: 0
SUM OF ALL RESPONSES TO PHQ QUESTIONS 1-9: 1
4. FEELING TIRED OR HAVING LITTLE ENERGY: 0
5. POOR APPETITE OR OVEREATING: 0
7. TROUBLE CONCENTRATING ON THINGS, SUCH AS READING THE NEWSPAPER OR WATCHING TELEVISION: 0
3. TROUBLE FALLING OR STAYING ASLEEP: 0
SUM OF ALL RESPONSES TO PHQ9 QUESTIONS 1 & 2: 1

## 2022-03-01 NOTE — PROGRESS NOTES
Annual Exam Office Visit   3/1/2022    Subjective:  Chief Complaint   Patient presents with    Annual Exam     HPI:  Karishma Medina is a 62 y.o. female who presents to the clinic today for an annual exam.    Former Smoker- pt states \"I cheat every once in a while. \" Last cigarette ERENDIRA. No longer using chantix. Hyperlipidemia  using lifestyle modifications. Walking. Eating healthy. Cold soreshas Valtrex as needed. Rare use. History of basal cell cancer - sees dermatology yearly. No acute concerns. 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 3 daughters. 4 grandchildren. . For fun, she enjoys riding a motorcycle. Lives in [facility]. 2 puppies. Review of Systems   Constitutional: Negative for chills, fatigue and fever. HENT: Negative for congestion, postnasal drip, sinus pain and sore throat. Respiratory: Negative for cough, shortness of breath and wheezing. Cardiovascular: Negative for chest pain, palpitations and leg swelling. Gastrointestinal: Negative for abdominal pain, constipation, diarrhea, nausea and vomiting. Genitourinary: Negative for dysuria, frequency, hematuria and urgency. Skin: Negative for pallor and rash. Neurological: Negative for dizziness, weakness, light-headedness, numbness and headaches. Psychiatric/Behavioral: Negative for dysphoric mood, sleep disturbance and suicidal ideas. The patient is not nervous/anxious.       Allergies   Allergen Reactions    Z-Jacob [Azithromycin] Hives and Itching    Cephalexin Rash    Sulfa Antibiotics Rash     Family History   Problem Relation Age of Onset    Osteoporosis Mother     High Blood Pressure Mother     Breast Cancer Mother     Cancer Mother         bladder with pelvic mets    Cirrhosis Father         related to alcohol    Lung Cancer Father     Cataracts Father     Other Father         glass eye d/t trauma from a dog    Stroke Maternal Grandfather     Stroke Maternal Uncle  Ovarian Cancer Neg Hx     Heart Attack Neg Hx      Current Outpatient Rx   Medication Sig Dispense Refill    valACYclovir (VALTREX) 1 g tablet Take 2 tablets by mouth as needed (Take 2 tablets and 2 more tablets 12 hours later at start of cold sore) 20 tablet 0     Social History     Socioeconomic History    Marital status:      Spouse name: Not on file    Number of children: Not on file    Years of education: Not on file    Highest education level: Not on file   Occupational History    Not on file   Tobacco Use    Smoking status: Former Smoker     Packs/day: 0.25     Years: 20.00     Pack years: 5.00     Types: Cigarettes     Quit date: 2020     Years since quittin.2    Smokeless tobacco: Never Used    Tobacco comment: quit:  4 years ago   Vaping Use    Vaping Use: Never used   Substance and Sexual Activity    Alcohol use: Yes     Comment: 5 drinks per week    Drug use: No    Sexual activity: Yes     Partners: Male     Birth control/protection: Surgical   Other Topics Concern    Not on file   Social History Narrative    Works as a teacher at [employer]. 3 daughters. 4 grandchildren. . For fun, she enjoys riding a motorcycle. Lives in [facility]. 2 puppies. Social Determinants of Health     Financial Resource Strain: Low Risk     Difficulty of Paying Living Expenses: Not hard at all   Food Insecurity: No Food Insecurity    Worried About Running Out of Food in the Last Year: Never true    Alysia of Food in the Last Year: Never true   Transportation Needs: No Transportation Needs    Lack of Transportation (Medical): No    Lack of Transportation (Non-Medical):  No   Physical Activity:     Days of Exercise per Week: Not on file    Minutes of Exercise per Session: Not on file   Stress:     Feeling of Stress : Not on file   Social Connections:     Frequency of Communication with Friends and Family: Not on file    Frequency of Social Gatherings with Friends and Family: Not on file    Attends Taoism Services: Not on file    Active Member of Clubs or Organizations: Not on file    Attends Club or Organization Meetings: Not on file    Marital Status: Not on file   Intimate Partner Violence:     Fear of Current or Ex-Partner: Not on file    Emotionally Abused: Not on file    Physically Abused: Not on file    Sexually Abused: Not on file   Housing Stability:     Unable to Pay for Housing in the Last Year: Not on file    Number of [place] in the Last Year: Not on file    Unstable Housing in the Last Year: Not on file     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)    IBS (irritable bowel syndrome)     not medically treated    Other hyperlipidemia     Wears glasses     reading only     Patient Active Problem List   Diagnosis    IBS (irritable bowel syndrome)    Depression    Fracture of radius    Scaphoid fracture of wrist    Ulna fracture    Visit for suture removal    Other hyperlipidemia    Cold sore      Wt Readings from Last 3 Encounters:   03/01/22 143 lb (64.9 kg)   02/18/22 140 lb (63.5 kg)   06/29/21 135 lb 12.8 oz (61.6 kg)     BP Readings from Last 3 Encounters:   03/01/22 136/80   06/29/21 120/70   04/28/21 132/80     The 10-year ASCVD risk score (Amanda Myers et al., 2013) is: 3.5%    Values used to calculate the score:      Age: 62 years      Sex: Female      Is Non- : No      Diabetic: No      Tobacco smoker: No      Systolic Blood Pressure: 362 mmHg      Is BP treated: No      HDL Cholesterol: 56 mg/dL      Total Cholesterol: 263 mg/dL    PHQ-9 Total Score: 1 (3/1/2022  3:56 PM)  Thoughts that you would be better off dead, or of hurting yourself in some way: 0 (3/1/2022  3:56 PM)    Objective/Physical Exam:  /80 (Site: Right Upper Arm, Position: Sitting)   Pulse 70   Ht 5' 2\" (1.575 m)   Wt 143 lb (64.9 kg)   SpO2 97%   BMI 26.16 kg/m²   Body mass index is 26.16 kg/m². Physical Exam  Vitals reviewed. Constitutional:       General: She is not in acute distress. Appearance: She is well-developed. She is not diaphoretic. HENT:      Head: Normocephalic and atraumatic. Eyes:      Pupils: Pupils are equal, round, and reactive to light. Cardiovascular:      Rate and Rhythm: Normal rate and regular rhythm. Pulmonary:      Effort: Pulmonary effort is normal. No respiratory distress. Breath sounds: Normal breath sounds. No wheezing or rales. Chest:      Chest wall: No tenderness. Abdominal:      General: Bowel sounds are normal. There is no distension. Palpations: Abdomen is soft. Tenderness: There is no abdominal tenderness. There is no guarding. Skin:     General: Skin is warm and dry. Neurological:      Mental Status: She is alert and oriented to person, place, and time. Coordination: Coordination normal.   Psychiatric:         Mood and Affect: Mood normal.       Assessment and Plan:  [place] was seen today for annual exam.  Diagnoses and all orders for this visit:    Annual physical exam  -     Comprehensive Metabolic Panel; Future  - Wears her seatbelt in the car.  - Feels safe in her home and in her relationships.  - Exercises via walking.  - Diet is reported as healthy. Other hyperlipidemia  -     Lipid, Fasting; Future    Former smoker  -    Congratulated on smoking cessation.   - CBC with Auto Differential; Future    Cold sore   - Continue as needed use. Vaccine counseling   - CDC guidelines regarding COVID-19 booster reviewed with the pt. Return in about 1 year (around 3/1/2023) for physical, or sooner if needed. Pt will call if symptoms worsen or fail to improve. All questions answered. Pt states no further questions or concerns at this time.    Electronically signed by: Michelle Rouse HORTENCIA - CNP 03/01/22

## 2022-03-01 NOTE — PATIENT INSTRUCTIONS
Please get your fasting lab work (no food or drink for 10-12 hours prior besides water) completed M-F 730a-4p at our office. [facility] lab has walk-in hours available as well - they are open Saturday 7a-3p - no appointment is needed. We will call with your results.

## 2022-03-04 DIAGNOSIS — Z87.891 FORMER SMOKER: ICD-10-CM

## 2022-03-04 DIAGNOSIS — E78.49 OTHER HYPERLIPIDEMIA: ICD-10-CM

## 2022-03-04 DIAGNOSIS — Z00.00 ANNUAL PHYSICAL EXAM: ICD-10-CM

## 2022-03-04 LAB
A/G RATIO: 2.3 (ref 1.1–2.2)
ALBUMIN SERPL-MCNC: 4.9 G/DL (ref 3.4–5)
ALP BLD-CCNC: 87 U/L (ref 40–129)
ALT SERPL-CCNC: 22 U/L (ref 10–40)
ANION GAP SERPL CALCULATED.3IONS-SCNC: 14 MMOL/L (ref 3–16)
AST SERPL-CCNC: 23 U/L (ref 15–37)
BASOPHILS ABSOLUTE: 0 K/UL (ref 0–0.2)
BASOPHILS RELATIVE PERCENT: 0.9 %
BILIRUB SERPL-MCNC: 0.3 MG/DL (ref 0–1)
BUN BLDV-MCNC: 13 MG/DL (ref 7–20)
CALCIUM SERPL-MCNC: 9.5 MG/DL (ref 8.3–10.6)
CHLORIDE BLD-SCNC: 103 MMOL/L (ref 99–110)
CHOLESTEROL, FASTING: 256 MG/DL (ref 0–199)
CO2: 26 MMOL/L (ref 21–32)
CREAT SERPL-MCNC: 0.7 MG/DL (ref 0.6–1.1)
EOSINOPHILS ABSOLUTE: 0.1 K/UL (ref 0–0.6)
EOSINOPHILS RELATIVE PERCENT: 2.9 %
GFR AFRICAN AMERICAN: >60
GFR NON-AFRICAN AMERICAN: >60
GLUCOSE BLD-MCNC: 83 MG/DL (ref 70–99)
HCT VFR BLD CALC: 41.9 % (ref 36–48)
HDLC SERPL-MCNC: 51 MG/DL (ref 40–60)
HEMOGLOBIN: 14.1 G/DL (ref 12–16)
LDL CHOLESTEROL CALCULATED: 174 MG/DL
LYMPHOCYTES ABSOLUTE: 1.3 K/UL (ref 1–5.1)
LYMPHOCYTES RELATIVE PERCENT: 28.3 %
MCH RBC QN AUTO: 30.5 PG (ref 26–34)
MCHC RBC AUTO-ENTMCNC: 33.8 G/DL (ref 31–36)
MCV RBC AUTO: 90.3 FL (ref 80–100)
MONOCYTES ABSOLUTE: 0.3 K/UL (ref 0–1.3)
MONOCYTES RELATIVE PERCENT: 6.7 %
NEUTROPHILS ABSOLUTE: 2.9 K/UL (ref 1.7–7.7)
NEUTROPHILS RELATIVE PERCENT: 61.2 %
PDW BLD-RTO: 13.2 % (ref 12.4–15.4)
PLATELET # BLD: 224 K/UL (ref 135–450)
PMV BLD AUTO: 7.1 FL (ref 5–10.5)
POTASSIUM SERPL-SCNC: 4.3 MMOL/L (ref 3.5–5.1)
RBC # BLD: 4.64 M/UL (ref 4–5.2)
SODIUM BLD-SCNC: 143 MMOL/L (ref 136–145)
TOTAL PROTEIN: 7 G/DL (ref 6.4–8.2)
TRIGLYCERIDE, FASTING: 156 MG/DL (ref 0–150)
VLDLC SERPL CALC-MCNC: 31 MG/DL
WBC # BLD: 4.7 K/UL (ref 4–11)

## 2022-05-19 ENCOUNTER — OFFICE VISIT (OUTPATIENT)
Dept: DERMATOLOGY | Age: 57
End: 2022-05-19
Payer: COMMERCIAL

## 2022-05-19 DIAGNOSIS — D18.01 HEMANGIOMA OF SKIN: ICD-10-CM

## 2022-05-19 DIAGNOSIS — L57.0 AK (ACTINIC KERATOSIS): ICD-10-CM

## 2022-05-19 DIAGNOSIS — D22.9 MULTIPLE NEVI: ICD-10-CM

## 2022-05-19 DIAGNOSIS — Z85.828 HISTORY OF NONMELANOMA SKIN CANCER: Primary | ICD-10-CM

## 2022-05-19 DIAGNOSIS — L81.4 LENTIGINES: ICD-10-CM

## 2022-05-19 DIAGNOSIS — B07.9 VERRUCA VULGARIS: ICD-10-CM

## 2022-05-19 PROCEDURE — 17000 DESTRUCT PREMALG LESION: CPT | Performed by: DERMATOLOGY

## 2022-05-19 PROCEDURE — 17003 DESTRUCT PREMALG LES 2-14: CPT | Performed by: DERMATOLOGY

## 2022-05-19 PROCEDURE — 99213 OFFICE O/P EST LOW 20 MIN: CPT | Performed by: DERMATOLOGY

## 2022-05-19 PROCEDURE — 17110 DESTRUCTION B9 LES UP TO 14: CPT | Performed by: DERMATOLOGY

## 2022-05-19 RX ORDER — VALACYCLOVIR HYDROCHLORIDE 1 G/1
TABLET, FILM COATED ORAL
Qty: 16 TABLET | Refills: 2 | Status: SHIPPED | OUTPATIENT
Start: 2022-05-19

## 2022-05-19 NOTE — PROGRESS NOTES
[facility]  Mg Roman MD  [address] [place] [address]  1965    62 y.o. female     Date of Visit: 5/19/2022    Chief Complaint: f/u skin cancer, lesions  Chief Complaint   Patient presents with    Skin Exam     FSE         HX:NMSC     Last seen: 3-2021   for filler    History of Present Illness:    1. Hx of NMSC - here for full skin check,   s/p Mohs for lesion on the R cheek 6-2012  S/p Mohs for [address] on the frontal scalp 3-2017  L lateral lower shin - sup BCC -curettage June 2020  She has had no problems with previous sites and no new concerns. 2.  History of AK's - few new concerns on the face and lip. 3. She has multiple asx pigmented lesions on the trunk and extremities, present for many years; no change in size/shape/color of any lesions; no bleeding lesions. 4. She has a few red lesions on the scalp, some near her previous BCC. 5. She has a wart on the  R index that is bothersome. She had her first treatment with filler for her oral commissures and MM lines as well as the lips . She had no complications except for bruising and was very happy with improvement, remedios around her commissures/MM area. She might like to have her lips augmented more in the future. Hx of laser x 1 for asphalt tattoo on the upper lip with improvement. Hx of dermatitis on the chest - cleared with TAC. Review of Systems:  Gen: Feels well, good sense of health. Skin: No changing moles or lesions. Past Medical History, Family History, Surgical History, Medications and Allergies reviewed.     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)    IBS (irritable bowel syndrome)     not medically treated    Other hyperlipidemia     Wears glasses     reading only       Past Surgical History:   Procedure Laterality Date    BREAST ENHANCEMENT SURGERY Bilateral      SECTION      times 3    SKIN BIOPSY      basal cell removed    TUBAL LIGATION      WISDOM TOOTH EXTRACTION      WRIST FRACTURE SURGERY Left 1/22/15    Open reduction & internal fixation of Left distal radius fracture - 2 components & Screw Fixation of left scaphoid       Outpatient Medications Marked as Taking for the 22 encounter (Office Visit) with Marisol Marrero MD   Medication Sig Dispense Refill    valACYclovir (VALTREX) 1 g tablet Take 2 tablets by mouth as needed (Take 2 tablets and 2 more tablets 12 hours later at start of cold sore) 20 tablet 0       Allergies   Allergen Reactions    Z-Jacob [Azithromycin] Hives and Itching    Cephalexin Rash    Sulfa Antibiotics Rash         Physical Examination     Gen, NAD  FSE today    R cheek scar and others clear  FH and upper lip with erythematous roughly scaled macules  trunk and extremities with scattered brown macules and papules - photo of the L breast and back in media - stable  Frontal scalp with tiny 1 mm red macule  Scars clear  R index with verrucous 3 mm papule                Assessment and Plan     1. Hx of NMSC, no signs recurrence  - educ re si/sx/ABCD's of MM   educ sun protection - OTC sunscreen with SPF 30-50+ recommended and reviewed usage  encouraged skin check yearly (sooner if indicated), self checks    2. AK - face x 4 (upper lip, FH near hairline)  - 5 lesion(s) treated with liquid nitrogen x 2 cycles. Patient educated on risk of blister, hypopigmentation/scar and wound care. - cont sun protection    3. Benign-appearing nevi and lentigines  - educ re si/sx/ABCD's of MM   educ sun protection - OTC sunscreen with SPF 30-50+ recommended and reviewed usage  encouraged skin check yearly (sooner if indicated), self checks    4. angiomas - frontal scalp  - reassured    5. Verruca vulgaris - R index - 1  - lesion(s)  treated with liquid nitrogen x 2 cycles after consent from patient. Patient educated on risk of blister, hypopigmentation/scar and wound care. S/p filler at previous visit - very happy with results and would like to rtn for trx of commissures/lips - likely juvederm ultra  550 -1000 depending on if 1 or 2 syringes for lip lines and MM.

## 2022-06-23 ENCOUNTER — PROCEDURE VISIT (OUTPATIENT)
Dept: DERMATOLOGY | Age: 57
End: 2022-06-23

## 2022-06-23 DIAGNOSIS — L98.8 WRINKLES: Primary | ICD-10-CM

## 2022-06-23 PROCEDURE — DM00200 PR DERM ONLY JUVEDERM ULTRA XC: Performed by: DERMATOLOGY

## 2022-06-23 NOTE — PROGRESS NOTES
[facility]  Keshia Matos MD  [address] [place] [address]  1965    62 y.o. female     Date of Visit: 2022    Chief Complaint: filler  Chief Complaint   Patient presents with    Procedure     filler     Last seen: FSE this year   for filler    History of Present Illness:    1. Here for trx of commissures/perioral wrinkles and lip lines, lip. Desires a bit of lip augmentation. No bleeding probs or blood thinners. One treatment with filler for her oral commissures and MM lines as well as the lips . She had no complications except for bruising and was very happy with improvement, remedios around her commissures/MM area. Hx of laser x 1 for asphalt tattoo on the upper lip with improvement. Hx of dermatitis on the chest - cleared with TAC. Review of Systems:  Gen: Feels well, good sense of health. Past Medical History, Family History, Surgical History, Medications and Allergies reviewed. 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)    IBS (irritable bowel syndrome)     not medically treated    Other hyperlipidemia     Wears glasses     reading only       Past Surgical History:   Procedure Laterality Date    BREAST ENHANCEMENT SURGERY Bilateral      SECTION      times 3    SKIN BIOPSY      basal cell removed    TUBAL LIGATION      WISDOM TOOTH EXTRACTION      WRIST FRACTURE SURGERY Left 1/22/15    Open reduction & internal fixation of Left distal radius fracture - 2 components & Screw Fixation of left scaphoid       Outpatient Medications Marked as Taking for the 22 encounter (Procedure visit) with Radha Phillips MD   Medication Sig Dispense Refill    valACYclovir (VALTREX) 1 g tablet Take 2 tabs po at first signs of flare and then 2 tabs po about 12 hours later.  16 tablet 2    tretinoin (RETIN-A) 0.025 % cream Apply a pea sized amount to the face QHS 20 g 4       Allergies   Allergen Reactions    Z-Jacob [Azithromycin] Hives and Itching    Cephalexin Rash    Sulfa Antibiotics Rash         Physical Examination     Gen, NAD    Baseline today        immed after trx:          Baseline today            Assessment and Plan     Wrinkles  - Patient consented and questions answered, expectations discussed and risks/benefits discussed including bruising, asymmetry, palpable product and erythema and rare vascular compromise/necrosis. Areas anesthetized with topical LMX x 15 min. Focal lido blebs injected for cannula insertion. juvederm ultra XC injected to   - small amount to MM area/prejowl area with cannula  - perioral wrinkles/commisures with needle  - few vertical lip lines with needle  - vermillon border + dry mucosa of upper lip with needle    No noted complications. Treated areas iced. Ed aftercare, no rubbing, ice and call with questions. F/u prn for concerns or additional filler if desired. 550    Had quoted 550 -1000 depending on if 1 or 2 syringes for lip lines and MM.

## 2022-07-12 ENCOUNTER — OFFICE VISIT (OUTPATIENT)
Dept: DERMATOLOGY | Age: 57
End: 2022-07-12

## 2022-07-12 DIAGNOSIS — L98.8 WRINKLES: Primary | ICD-10-CM

## 2022-07-12 PROCEDURE — 99999 PR OFFICE/OUTPT VISIT,PROCEDURE ONLY: CPT | Performed by: DERMATOLOGY

## 2022-07-12 NOTE — PROGRESS NOTES
[facility]  Rosetta Rinne, MD  [address] [place] [address]  1965    62 y.o. female     Date of Visit: 2022    Chief Complaint: filler f/u  Chief Complaint   Patient presents with    Procedure     Filler f/u     Last seen: FSE this year   last filler     History of Present Illness:    1. Here for 2 week planned f/u s/p trx of commissures/perioral wrinkles and lip lines, lip. No probs with trx and very happy with results. One treatment with filler for her oral commissures and MM lines as well as the lips . She had no complications except for bruising and was very happy with improvement, remedios around her commissures/MM area. Hx of laser x 1 for asphalt tattoo on the upper lip with improvement. Hx of dermatitis on the chest - cleared with TAC. Review of Systems:  Gen: Feels well, good sense of health. Past Medical History, Family History, Surgical History, Medications and Allergies reviewed. 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)    IBS (irritable bowel syndrome)     not medically treated    Other hyperlipidemia     Wears glasses     reading only       Past Surgical History:   Procedure Laterality Date    BREAST ENHANCEMENT SURGERY Bilateral      SECTION      times 3    SKIN BIOPSY      basal cell removed    TUBAL LIGATION      WISDOM TOOTH EXTRACTION      WRIST FRACTURE SURGERY Left 1/22/15    Open reduction & internal fixation of Left distal radius fracture - 2 components & Screw Fixation of left scaphoid       Outpatient Medications Marked as Taking for the 22 encounter (Office Visit) with Annie Nicole MD   Medication Sig Dispense Refill    valACYclovir (VALTREX) 1 g tablet Take 2 tabs po at first signs of flare and then 2 tabs po about 12 hours later.  16 tablet 2   

## 2023-04-04 ENCOUNTER — OFFICE VISIT (OUTPATIENT)
Dept: INTERNAL MEDICINE CLINIC | Age: 58
End: 2023-04-04
Payer: COMMERCIAL

## 2023-04-04 VITALS
SYSTOLIC BLOOD PRESSURE: 128 MMHG | BODY MASS INDEX: 26.54 KG/M2 | HEART RATE: 83 BPM | WEIGHT: 144.2 LBS | HEIGHT: 62 IN | OXYGEN SATURATION: 98 % | DIASTOLIC BLOOD PRESSURE: 76 MMHG

## 2023-04-04 DIAGNOSIS — Z87.891 FORMER SMOKER: ICD-10-CM

## 2023-04-04 DIAGNOSIS — Z00.00 ANNUAL PHYSICAL EXAM: Primary | ICD-10-CM

## 2023-04-04 DIAGNOSIS — B00.1 COLD SORE: ICD-10-CM

## 2023-04-04 DIAGNOSIS — Z12.31 ENCOUNTER FOR SCREENING MAMMOGRAM FOR MALIGNANT NEOPLASM OF BREAST: ICD-10-CM

## 2023-04-04 DIAGNOSIS — E78.49 OTHER HYPERLIPIDEMIA: ICD-10-CM

## 2023-04-04 PROCEDURE — 99396 PREV VISIT EST AGE 40-64: CPT | Performed by: NURSE PRACTITIONER

## 2023-04-04 SDOH — ECONOMIC STABILITY: HOUSING INSECURITY
IN THE LAST 12 MONTHS, WAS THERE A TIME WHEN YOU DID NOT HAVE A STEADY PLACE TO SLEEP OR SLEPT IN A SHELTER (INCLUDING NOW)?: NO

## 2023-04-04 SDOH — ECONOMIC STABILITY: INCOME INSECURITY: HOW HARD IS IT FOR YOU TO PAY FOR THE VERY BASICS LIKE FOOD, HOUSING, MEDICAL CARE, AND HEATING?: NOT HARD AT ALL

## 2023-04-04 SDOH — ECONOMIC STABILITY: FOOD INSECURITY: WITHIN THE PAST 12 MONTHS, THE FOOD YOU BOUGHT JUST DIDN'T LAST AND YOU DIDN'T HAVE MONEY TO GET MORE.: NEVER TRUE

## 2023-04-04 SDOH — ECONOMIC STABILITY: FOOD INSECURITY: WITHIN THE PAST 12 MONTHS, YOU WORRIED THAT YOUR FOOD WOULD RUN OUT BEFORE YOU GOT MONEY TO BUY MORE.: NEVER TRUE

## 2023-04-04 ASSESSMENT — PATIENT HEALTH QUESTIONNAIRE - PHQ9
8. MOVING OR SPEAKING SO SLOWLY THAT OTHER PEOPLE COULD HAVE NOTICED. OR THE OPPOSITE, BEING SO FIGETY OR RESTLESS THAT YOU HAVE BEEN MOVING AROUND A LOT MORE THAN USUAL: 1
6. FEELING BAD ABOUT YOURSELF - OR THAT YOU ARE A FAILURE OR HAVE LET YOURSELF OR YOUR FAMILY DOWN: 1
9. THOUGHTS THAT YOU WOULD BE BETTER OFF DEAD, OR OF HURTING YOURSELF: 0
SUM OF ALL RESPONSES TO PHQ9 QUESTIONS 1 & 2: 4
4. FEELING TIRED OR HAVING LITTLE ENERGY: 0
7. TROUBLE CONCENTRATING ON THINGS, SUCH AS READING THE NEWSPAPER OR WATCHING TELEVISION: 0
SUM OF ALL RESPONSES TO PHQ QUESTIONS 1-9: 7
10. IF YOU CHECKED OFF ANY PROBLEMS, HOW DIFFICULT HAVE THESE PROBLEMS MADE IT FOR YOU TO DO YOUR WORK, TAKE CARE OF THINGS AT HOME, OR GET ALONG WITH OTHER PEOPLE: 1
1. LITTLE INTEREST OR PLEASURE IN DOING THINGS: 2
SUM OF ALL RESPONSES TO PHQ QUESTIONS 1-9: 7
SUM OF ALL RESPONSES TO PHQ QUESTIONS 1-9: 7
2. FEELING DOWN, DEPRESSED OR HOPELESS: 2
3. TROUBLE FALLING OR STAYING ASLEEP: 1
5. POOR APPETITE OR OVEREATING: 0
SUM OF ALL RESPONSES TO PHQ QUESTIONS 1-9: 7

## 2023-04-04 ASSESSMENT — ENCOUNTER SYMPTOMS
SHORTNESS OF BREATH: 0
COUGH: 0
VOMITING: 0
NAUSEA: 0
WHEEZING: 0
ABDOMINAL PAIN: 0
CONSTIPATION: 0
DIARRHEA: 0

## 2023-04-04 NOTE — PROGRESS NOTES
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)    IBS (irritable bowel syndrome)     not medically treated    Other hyperlipidemia     Wears glasses     reading only     Patient Active Problem List   Diagnosis    IBS (irritable bowel syndrome)    Depression    Fracture of radius    Scaphoid fracture of wrist    Ulna fracture    Visit for suture removal    Other hyperlipidemia    Cold sore      Wt Readings from Last 3 Encounters:   04/04/23 144 lb 3.2 oz (65.4 kg)   03/01/22 143 lb (64.9 kg)   02/18/22 140 lb (63.5 kg)     BP Readings from Last 3 Encounters:   04/04/23 128/76   03/01/22 136/80   06/29/21 120/70     The 10-year ASCVD risk score (Warner MONTES DE OCA, et al., 2019) is: 3.5%    Values used to calculate the score:      Age: 62 years      Sex: Female      Is Non- : No      Diabetic: No      Tobacco smoker: No      Systolic Blood Pressure: 849 mmHg      Is BP treated: No      HDL Cholesterol: 51 mg/dL      Total Cholesterol: 256 mg/dL    PHQ-9 Total Score: 7 (4/4/2023 11:11 AM)  Thoughts that you would be better off dead, or of hurting yourself in some way: 0 (4/4/2023 11:11 AM)    Objective/Physical Exam:  /76   Pulse 83   Ht 5' 2\" (1.575 m)   Wt 144 lb 3.2 oz (65.4 kg)   SpO2 98%   BMI 26.37 kg/m²   Body mass index is 26.37 kg/m². Physical Exam  Vitals reviewed. Constitutional:       General: She is not in acute distress. Appearance: She is well-developed. She is not diaphoretic. HENT:      Head: Normocephalic and atraumatic. Eyes:      Pupils: Pupils are equal, round, and reactive to light. Cardiovascular:      Rate and Rhythm: Normal rate and regular rhythm. Pulmonary:      Effort: Pulmonary effort is normal. No respiratory distress. Breath sounds: Normal breath sounds. No wheezing or rales. Chest:      Chest wall: No tenderness.    Abdominal:      General:

## 2023-04-04 NOTE — PATIENT INSTRUCTIONS
Please get your fasting lab work (no food or drink for 10-12 hours prior besides water) completed M-F 730a-430p at our office. [facility] lab has walk-in hours available as well - no appointment is needed. We will call or mychart message you with your results.      Call [address] [place] [address] to schedule mammogram

## 2023-10-30 ENCOUNTER — OFFICE VISIT (OUTPATIENT)
Dept: DERMATOLOGY | Age: 58
End: 2023-10-30
Payer: COMMERCIAL

## 2023-10-30 DIAGNOSIS — D22.9 MULTIPLE NEVI: ICD-10-CM

## 2023-10-30 DIAGNOSIS — D48.5 NEOPLASM OF UNCERTAIN BEHAVIOR OF SKIN: ICD-10-CM

## 2023-10-30 DIAGNOSIS — Z85.828 HISTORY OF NONMELANOMA SKIN CANCER: Primary | ICD-10-CM

## 2023-10-30 DIAGNOSIS — L81.4 LENTIGINES: ICD-10-CM

## 2023-10-30 DIAGNOSIS — D18.01 HEMANGIOMA OF SKIN: ICD-10-CM

## 2023-10-30 DIAGNOSIS — L57.0 AK (ACTINIC KERATOSIS): ICD-10-CM

## 2023-10-30 PROCEDURE — 17000 DESTRUCT PREMALG LESION: CPT | Performed by: DERMATOLOGY

## 2023-10-30 PROCEDURE — 99213 OFFICE O/P EST LOW 20 MIN: CPT | Performed by: DERMATOLOGY

## 2023-10-30 PROCEDURE — 17003 DESTRUCT PREMALG LES 2-14: CPT | Performed by: DERMATOLOGY

## 2023-10-30 PROCEDURE — 11102 TANGNTL BX SKIN SINGLE LES: CPT | Performed by: DERMATOLOGY

## 2023-10-30 NOTE — PROGRESS NOTES
ENHANCEMENT SURGERY Bilateral      SECTION      times 3    SKIN BIOPSY      basal cell removed    TUBAL LIGATION      WISDOM TOOTH EXTRACTION      WRIST FRACTURE SURGERY Left 1/22/15    Open reduction & internal fixation of Left distal radius fracture - 2 components & Screw Fixation of left scaphoid       Outpatient Medications Marked as Taking for the 10/30/23 encounter (Office Visit) with Kvng Armstrong MD   Medication Sig Dispense Refill    valACYclovir (VALTREX) 1 g tablet Take 2 tabs po at first signs of flare and then 2 tabs po about 12 hours later. 16 tablet 2    tretinoin (RETIN-A) 0.025 % cream Apply a pea sized amount to the face QHS 20 g 4       Allergies   Allergen Reactions    Z-Jacob [Azithromycin] Hives and Itching    Cephalexin Rash    Sulfa Antibiotics Rash         Physical Examination     Gen, NAD  FSE today except for underwear-covered areas    R cheek scar and others clear  Face w few erythematous roughly scaled macules  trunk and extremities with scattered brown macules and papules - photo of the L breast and back in media - stable  Frontal scalp with tiny 1 mm red macule  Scars clear  Symptomatic on the left of midline with irregular dark brown macule                      Assessment and Plan     1. Hx of NMSC, no signs recurrence  - Monitor for ABCD's of MM and si/sx of NMSC  Continue sun protection - OTC sunscreen with SPF 30-50+ recommended and reviewed usage  Encouraged skin check yearly (sooner if indicated), self checks    2. AK - face x 5 (R temple and R FH near hairline, L NL fold, nose)  - 5 lesion(s) treated with liquid nitrogen with cryac or swab. Treated with 2 cycles for 1-5 seconds each after consent from patient. Patient educated on risk of blister, hypopigmentation/scar and wound care. Tolerated well. - cont sun protection    3.   Benign-appearing nevi and lentigines  - Monitor for ABCD's of MM and si/sx of NMSC  Continue sun protection - OTC sunscreen with

## 2023-11-02 LAB — DERMATOLOGY PATHOLOGY REPORT: NORMAL

## 2024-01-04 ENCOUNTER — OFFICE VISIT (OUTPATIENT)
Dept: INTERNAL MEDICINE CLINIC | Age: 59
End: 2024-01-04
Payer: COMMERCIAL

## 2024-01-04 VITALS
DIASTOLIC BLOOD PRESSURE: 84 MMHG | OXYGEN SATURATION: 99 % | TEMPERATURE: 98.9 F | BODY MASS INDEX: 24.14 KG/M2 | HEART RATE: 85 BPM | SYSTOLIC BLOOD PRESSURE: 134 MMHG | WEIGHT: 132 LBS

## 2024-01-04 DIAGNOSIS — J06.9 VIRAL URI WITH COUGH: Primary | ICD-10-CM

## 2024-01-04 LAB
INFLUENZA A ANTIBODY: NEGATIVE
INFLUENZA B ANTIBODY: NEGATIVE

## 2024-01-04 PROCEDURE — 87804 INFLUENZA ASSAY W/OPTIC: CPT | Performed by: INTERNAL MEDICINE

## 2024-01-04 PROCEDURE — 99213 OFFICE O/P EST LOW 20 MIN: CPT | Performed by: INTERNAL MEDICINE

## 2024-01-04 RX ORDER — GUAIFENESIN/DEXTROMETHORPHAN 100-10MG/5
5 SYRUP ORAL 3 TIMES DAILY PRN
Qty: 118 ML | Refills: 0 | Status: SHIPPED | OUTPATIENT
Start: 2024-01-04 | End: 2024-01-14

## 2024-01-04 RX ORDER — VALACYCLOVIR HYDROCHLORIDE 1 G/1
TABLET, FILM COATED ORAL
Qty: 16 TABLET | Refills: 2 | Status: SHIPPED | OUTPATIENT
Start: 2024-01-04

## 2024-01-04 ASSESSMENT — ENCOUNTER SYMPTOMS
SHORTNESS OF BREATH: 0
WHEEZING: 0
CONSTIPATION: 0
SORE THROAT: 1
COLOR CHANGE: 0
COUGH: 1
ABDOMINAL DISTENTION: 0
DIARRHEA: 0
VOMITING: 0
RHINORRHEA: 1

## 2024-01-04 NOTE — ASSESSMENT & PLAN NOTE
Flu testing negative  Given mucinex liquid as requested  No red flag signs  Given return to care precuations of fever, SOB, inability to tolerate PO intake

## 2024-01-04 NOTE — PROGRESS NOTES
Alanis Darby (:  1965) is a 58 y.o. female here for evaluation of the following chief complaint(s):  Cough (Productive cough - yellow sputum), Chest Congestion, Otalgia (Bilateral ear pain), and Pharyngitis      ASSESSMENT/PLAN:  1. Viral URI with cough      No follow-ups on file.    SUBJECTIVE/OBJECTIVE:  This is a 58 year old with history of IBS, HLD. She presents today as an acute visit.  Since  has been having cough, ear fullness, sore throat, runny nose.  Multiple sick contacts, 4 of which have tested positive for influenza.  No fevers.          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   HENT:  Positive for congestion, ear pain, rhinorrhea and sore throat.    Respiratory:  Positive for cough. Negative for shortness of breath and wheezing.    Cardiovascular:  Negative for chest pain and palpitations.   Gastrointestinal:  Negative for abdominal distention, constipation, diarrhea and vomiting.   Endocrine: Negative for polyuria.   Genitourinary:  Negative for difficulty urinating.   Musculoskeletal:  Negative for myalgias.   Skin:  Negative for color change and rash.       /84   Pulse 85   Temp 98.9 °F (37.2 °C) (Oral)   Wt 59.9 kg (132 lb)   SpO2 99%   BMI 24.14 kg/m²    Physical Exam  Vitals reviewed.   Constitutional:       General: She is not in acute distress.  HENT:      Head: Normocephalic and atraumatic.      Mouth/Throat:      Mouth: Mucous membranes are moist.      Pharynx: Posterior oropharyngeal erythema present. No oropharyngeal exudate.      Comments: Enlarged tonsils  No exudate  Eyes:      Extraocular Movements: Extraocular

## 2024-01-18 ENCOUNTER — PROCEDURE VISIT (OUTPATIENT)
Dept: DERMATOLOGY | Age: 59
End: 2024-01-18

## 2024-01-18 DIAGNOSIS — D23.5 DYSPLASTIC NEVUS OF TRUNK: Primary | ICD-10-CM

## 2024-01-18 NOTE — PATIENT INSTRUCTIONS
Care of Wound Closed with Dermabond    A special skin glue called Dermabond was used to hold your wound together on the surface of the skin.  The glue film will loosen from your skin on its own as the wound heals.    Follow these care guidelines:    Keep the wound dry.    Do not pick, scratch or rub the glue on the wound so it does not loosen before the wound heals.    Do not soak your wound in water until the glue film falls off.  Avoid swimming or using a hot tub while the glue is in place.    When you shower or bathe, let water run over the wound but do not rub. Pat the wound gently with a soft towel to dry.    Do no apply any cream, lotion or ointment to the skin near the wound. It could loosen the glue before the wound heals.     Do not apply any tape, sticky dressing, or alcohol to the glue site for 10 days.  These could also loosen the glue.    Call your doctor if you have any of these signs of infection:    Skin around the wound is more red, swollen or feels hot.    Fluid builds up under the glue    Wound smells bad    Pus drainage    Fever     Increasing pain    Surgical Wound Care Instructions    After your surgery, go home and take it easy.  Please refrain from any vigorous physical activity or heavy lifting for 14 days.  Leave the pressure bandage in place for 48 hours.  If it starts to detach, reinforce the bandage with another piece of tape.  Please keep the bandage dry for 48 hours.  After 48 hours, the wound can get wet but do not soak or scrub the area.  Apply a non stick bandage or non stick gauze pad to the site daily with medical tape for a total of 14 days.        Complications:  If bleeding develops when you go home, apply pressure for 15 minutes continuously.  A small amount of ice in a bag covered with a towel may be used for another 10 minutes if necessary.  If the bleeding does not stop, please call your dermatologist.  Please call the office if you develop any fevers, chills or pus drains

## 2024-01-18 NOTE — PROGRESS NOTES
Add 21755 Cpt? (Important Note: In 2017 The Use Of 17208 Is Being Tracked By Cms To Determine Future Global Period Reimbursement For Global Periods): yes [facility]  Ann Armstrong MD  [phone]      Alanis ORTEGA Dossenback  1965    58 y.o. female     Date of Visit: 2024    Chief Complaint: dysplastic nevus  Chief Complaint   Patient presents with    Procedure     Mod dysplasia on back     Last seen:     *dad passed away     History of Present Illness:    Here for excision of dysplastic nevus on the back - bx'd   Read as moderate dysplastic, not removed with biopsy and transected at the deep margin.  No probs since bx.    No blood thinners, no pacemaker, no defibrillator.    Hx of NMSC - here for full skin check,   s/p Mohs for lesion on the R cheek   S/p Mohs for BCC on the frontal scalp 3-2017  L lateral lower shin - sup BCC -curettage 2020      She had her first treatment with filler for her oral commissures and MM lines as well as the lips .  She had no complications except for bruising and was very happy with improvement, remedios around her commissures/MM area.  She might like to have her lips augmented more in the future.    Hx of laser x 1 for asphalt tattoo on the upper lip with improvement.  Hx of dermatitis on the chest - cleared with TAC.    Review of Systems:  Gen: Feels well, good sense of health.  Skin: No changing moles or lesions.    Past Medical History, Family History, Surgical History, Medications and Allergies reviewed.    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

## 2024-01-24 ENCOUNTER — TELEPHONE (OUTPATIENT)
Dept: DERMATOLOGY | Age: 59
End: 2024-01-24

## 2024-01-24 NOTE — TELEPHONE ENCOUNTER
Back - no residual atypical nevus.  Margins clear    No further treatment is needed at this time.  Please call if any concerns, particularly if pain, bleeding or new lesion growing. F/u for regular skin checks.

## 2024-03-18 ENCOUNTER — HOSPITAL ENCOUNTER (OUTPATIENT)
Dept: WOMENS IMAGING | Age: 59
Discharge: HOME | End: 2024-03-18
Payer: COMMERCIAL

## 2024-03-18 VITALS — HEIGHT: 62 IN | BODY MASS INDEX: 23 KG/M2 | WEIGHT: 125 LBS

## 2024-03-18 DIAGNOSIS — Z12.31 ENCOUNTER FOR SCREENING MAMMOGRAM FOR MALIGNANT NEOPLASM OF BREAST: ICD-10-CM

## 2024-03-18 PROCEDURE — 77063 BREAST TOMOSYNTHESIS BI: CPT

## 2024-04-01 DIAGNOSIS — E78.49 OTHER HYPERLIPIDEMIA: ICD-10-CM

## 2024-04-01 LAB
ALBUMIN SERPL-MCNC: 4.6 G/DL (ref 3.4–5)
ALBUMIN/GLOB SERPL: 2 {RATIO} (ref 1.1–2.2)
ALP SERPL-CCNC: 92 U/L (ref 40–129)
ALT SERPL-CCNC: 12 U/L (ref 10–40)
ANION GAP SERPL CALCULATED.3IONS-SCNC: 8 MMOL/L (ref 3–16)
AST SERPL-CCNC: 19 U/L (ref 15–37)
BILIRUB SERPL-MCNC: 0.4 MG/DL (ref 0–1)
BUN SERPL-MCNC: 16 MG/DL (ref 7–20)
CALCIUM SERPL-MCNC: 9.4 MG/DL (ref 8.3–10.6)
CHLORIDE SERPL-SCNC: 103 MMOL/L (ref 99–110)
CHOLEST SERPL-MCNC: 244 MG/DL (ref 0–199)
CO2 SERPL-SCNC: 29 MMOL/L (ref 21–32)
CREAT SERPL-MCNC: 0.9 MG/DL (ref 0.6–1.1)
GFR SERPLBLD CREATININE-BSD FMLA CKD-EPI: 74 ML/MIN/{1.73_M2}
GLUCOSE SERPL-MCNC: 84 MG/DL (ref 70–99)
HDLC SERPL-MCNC: 60 MG/DL (ref 40–60)
LDLC SERPL CALC-MCNC: 147 MG/DL
POTASSIUM SERPL-SCNC: 3.7 MMOL/L (ref 3.5–5.1)
PROT SERPL-MCNC: 6.9 G/DL (ref 6.4–8.2)
SODIUM SERPL-SCNC: 140 MMOL/L (ref 136–145)
TRIGL SERPL-MCNC: 183 MG/DL (ref 0–150)
VLDLC SERPL CALC-MCNC: 37 MG/DL

## 2024-05-22 ENCOUNTER — OFFICE VISIT (OUTPATIENT)
Dept: INTERNAL MEDICINE CLINIC | Age: 59
End: 2024-05-22
Payer: COMMERCIAL

## 2024-05-22 VITALS
HEIGHT: 62 IN | HEART RATE: 72 BPM | BODY MASS INDEX: 23.37 KG/M2 | WEIGHT: 127 LBS | DIASTOLIC BLOOD PRESSURE: 74 MMHG | OXYGEN SATURATION: 98 % | SYSTOLIC BLOOD PRESSURE: 116 MMHG

## 2024-05-22 DIAGNOSIS — B00.1 COLD SORE: ICD-10-CM

## 2024-05-22 DIAGNOSIS — Z00.00 ANNUAL PHYSICAL EXAM: Primary | ICD-10-CM

## 2024-05-22 DIAGNOSIS — E78.49 OTHER HYPERLIPIDEMIA: ICD-10-CM

## 2024-05-22 PROCEDURE — 99396 PREV VISIT EST AGE 40-64: CPT | Performed by: NURSE PRACTITIONER

## 2024-05-22 SDOH — ECONOMIC STABILITY: FOOD INSECURITY: WITHIN THE PAST 12 MONTHS, YOU WORRIED THAT YOUR FOOD WOULD RUN OUT BEFORE YOU GOT MONEY TO BUY MORE.: NEVER TRUE

## 2024-05-22 SDOH — ECONOMIC STABILITY: INCOME INSECURITY: HOW HARD IS IT FOR YOU TO PAY FOR THE VERY BASICS LIKE FOOD, HOUSING, MEDICAL CARE, AND HEATING?: NOT HARD AT ALL

## 2024-05-22 SDOH — ECONOMIC STABILITY: FOOD INSECURITY: WITHIN THE PAST 12 MONTHS, THE FOOD YOU BOUGHT JUST DIDN'T LAST AND YOU DIDN'T HAVE MONEY TO GET MORE.: NEVER TRUE

## 2024-05-22 ASSESSMENT — PATIENT HEALTH QUESTIONNAIRE - PHQ9
10. IF YOU CHECKED OFF ANY PROBLEMS, HOW DIFFICULT HAVE THESE PROBLEMS MADE IT FOR YOU TO DO YOUR WORK, TAKE CARE OF THINGS AT HOME, OR GET ALONG WITH OTHER PEOPLE: NOT DIFFICULT AT ALL
7. TROUBLE CONCENTRATING ON THINGS, SUCH AS READING THE NEWSPAPER OR WATCHING TELEVISION: NOT AT ALL
1. LITTLE INTEREST OR PLEASURE IN DOING THINGS: NOT AT ALL
SUM OF ALL RESPONSES TO PHQ9 QUESTIONS 1 & 2: 0
SUM OF ALL RESPONSES TO PHQ QUESTIONS 1-9: 1
6. FEELING BAD ABOUT YOURSELF - OR THAT YOU ARE A FAILURE OR HAVE LET YOURSELF OR YOUR FAMILY DOWN: NOT AT ALL
3. TROUBLE FALLING OR STAYING ASLEEP: SEVERAL DAYS
4. FEELING TIRED OR HAVING LITTLE ENERGY: NOT AT ALL
5. POOR APPETITE OR OVEREATING: NOT AT ALL
9. THOUGHTS THAT YOU WOULD BE BETTER OFF DEAD, OR OF HURTING YOURSELF: NOT AT ALL
SUM OF ALL RESPONSES TO PHQ QUESTIONS 1-9: 1
8. MOVING OR SPEAKING SO SLOWLY THAT OTHER PEOPLE COULD HAVE NOTICED. OR THE OPPOSITE, BEING SO FIGETY OR RESTLESS THAT YOU HAVE BEEN MOVING AROUND A LOT MORE THAN USUAL: NOT AT ALL
SUM OF ALL RESPONSES TO PHQ QUESTIONS 1-9: 1
2. FEELING DOWN, DEPRESSED OR HOPELESS: NOT AT ALL
SUM OF ALL RESPONSES TO PHQ QUESTIONS 1-9: 1

## 2024-05-22 ASSESSMENT — ENCOUNTER SYMPTOMS
NAUSEA: 0
ABDOMINAL PAIN: 0
CONSTIPATION: 0
SHORTNESS OF BREATH: 0
VOMITING: 0
DIARRHEA: 0
WHEEZING: 0
COUGH: 0

## 2024-05-22 NOTE — PROGRESS NOTES
Annual Physical Office Visit   5/22/2024    Subjective:  Chief Complaint   Patient presents with    Annual Exam     HPI:  Alanis Darby is a 59 y.o. female who presents to the clinic today for an annual physical.    Hyperlipidemia - Active at work. Diet is reported as healthy.     Cold sores-has Valtrex as needed. Rare use.     History of basal cell cancer - follows with dermatology yearly.    Last summer, her ex- committed suicide and then her ex-father-in-law passed away. Her father then got cancer again and passed away. 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 States her mood is doing well now.    Denies acute concerns.    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 Working at the [employer] during the day. 3 daughters. 5 grandchildren. . For fun, she enjoys riding a motorcycle. Lives in [place]. Working at the [employer] during the day.     1/4/2024 3/18/2024 5/22/2024   Vitals      Weight - Scale 132 lb  125 lb  127 lb      Review of Systems   Constitutional:  Negative for chills, fatigue, fever and unexpected weight change.   Eyes:  Negative for visual disturbance.   Respiratory:  Negative for cough, shortness of breath and wheezing.    Cardiovascular:  Negative for chest pain, palpitations and leg swelling.   Gastrointestinal:  Negative for abdominal pain, constipation, diarrhea, nausea and vomiting.   Skin:  Negative for pallor and rash.   Neurological:  Negative for dizziness, weakness, light-headedness, numbness and headaches.   Psychiatric/Behavioral:  Negative for dysphoric mood, self-injury, sleep disturbance and suicidal ideas. The patient is not nervous/anxious.      Allergies   Allergen Reactions    Z-Jacob [Azithromycin] Hives and Itching    Cephalexin Rash    Sulfa Antibiotics Rash     Family History   Problem Relation Age of Onset    Osteoporosis Mother     High Blood Pressure Mother     Breast Cancer Mother     Cancer Mother

## 2024-11-21 ENCOUNTER — PROCEDURE VISIT (OUTPATIENT)
Dept: DERMATOLOGY | Age: 59
End: 2024-11-21

## 2024-11-21 DIAGNOSIS — L98.8 WRINKLES: Primary | ICD-10-CM

## 2024-11-21 PROCEDURE — DM00200 PR DERM ONLY JUVEDERM ULTRA XC: Performed by: DERMATOLOGY

## 2024-11-21 RX ORDER — TRETINOIN 0.25 MG/G
CREAM TOPICAL
Qty: 20 G | Refills: 4 | Status: SHIPPED | OUTPATIENT
Start: 2024-11-21

## 2024-11-21 NOTE — PROGRESS NOTES
first signs of flare and then 2 tabs po about 12 hours later. 16 tablet 2    tretinoin (RETIN-A) 0.025 % cream Apply a pea sized amount to the face QHS 20 g 4       Allergies   Allergen Reactions    Z-Jacob [Azithromycin] Hives and Itching    Cephalexin Rash    Sulfa Antibiotics Rash         Physical Examination     Gen, NAD    Baseline today  2022:      immed after trx 2022          Baseline today            Assessment and Plan     Wrinkles  - Patient consented and questions answered, expectations discussed and risks/benefits discussed including bruising, asymmetry, palpable product and erythema and rare vascular compromise/necrosis.  Areas anesthetized with topical LMX x 15 min.  Focal lido blebs injected for cannula insertion.  juvederm ultra XC injected to   - small amount to MM area/prejowl area with cannula  - perioral wrinkles/commisures with needle  - few vertical lip lines with needle  - vermillon border upper lip with needle    No noted complications.  Treated areas iced.  Ed aftercare, no rubbing, ice and call with questions.  F/u prn for concerns or additional filler if desired.    550    *plan to trx lower lip and upper lip pink dry mucosa at f/u     Had quoted 550 -1000 depending on if 1 or 2 syringes for lip lines and MM.

## 2024-12-05 ENCOUNTER — OFFICE VISIT (OUTPATIENT)
Dept: DERMATOLOGY | Age: 59
End: 2024-12-05

## 2024-12-05 DIAGNOSIS — L98.8 WRINKLES: Primary | ICD-10-CM

## 2024-12-05 RX ORDER — VALACYCLOVIR HYDROCHLORIDE 1 G/1
TABLET, FILM COATED ORAL
Qty: 16 TABLET | Refills: 2 | Status: SHIPPED | OUTPATIENT
Start: 2024-12-05

## 2024-12-05 NOTE — PROGRESS NOTES
[facility]  Ann Armstrong MD  [phone]      Alanis ORTEGA Dossenback  1965    59 y.o. female     Date of Visit: 2024    Chief Complaint: filler f/u  Chief Complaint   Patient presents with    Follow-up     Last seen: 2 weeks ago    History of Present Illness:    Here for planned f/u s/p trx of commissures/perioral wrinkles and lip lines, lip for small amount of lip augmentation.  No complications.  Happy with improvement.  No bleeding probs or blood thinners.      Previous treatment with filler  and .  Treated oral commissures and MM lines as well as the focally lip lines and border.    She had no complications except for bruising and was very happy with improvement, remedios around her commissures/MM area.      Hx of laser x 1 for asphalt tattoo on the upper lip with improvement.  Hx of dermatitis on the chest - cleared with TAC.    Review of Systems:  Gen: Feels well, good sense of health.    Past Medical History, Family History, Surgical History, Medications and Allergies reviewed.    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)    IBS (irritable bowel syndrome)     not medically treated    Other hyperlipidemia     Wears glasses     reading only       Past Surgical History:   Procedure Laterality Date    BREAST ENHANCEMENT SURGERY Bilateral     implants     SECTION      times 3    SKIN BIOPSY      basal cell removed    TUBAL LIGATION      WISDOM TOOTH EXTRACTION      WRIST FRACTURE SURGERY Left 2015    Open reduction & internal fixation of Left distal radius fracture - 2 components & Screw Fixation of left scaphoid       Outpatient Medications Marked as Taking for the 24 encounter (Office Visit) with Ann Armstrong MD   Medication Sig Dispense Refill    tretinoin (RETIN-A) 0.025 % cream Apply a pea sized amount to the face

## 2025-06-19 ENCOUNTER — OFFICE VISIT (OUTPATIENT)
Age: 60
End: 2025-06-19

## 2025-06-19 DIAGNOSIS — D23.5 DYSPLASTIC NEVUS OF TRUNK: ICD-10-CM

## 2025-06-19 DIAGNOSIS — L57.0 AK (ACTINIC KERATOSIS): ICD-10-CM

## 2025-06-19 DIAGNOSIS — D18.01 HEMANGIOMA OF SKIN: ICD-10-CM

## 2025-06-19 DIAGNOSIS — D22.9 MULTIPLE NEVI: ICD-10-CM

## 2025-06-19 DIAGNOSIS — L81.4 LENTIGINES: ICD-10-CM

## 2025-06-19 DIAGNOSIS — Z85.828 HISTORY OF NONMELANOMA SKIN CANCER: Primary | ICD-10-CM

## 2025-06-19 NOTE — PROGRESS NOTES
[facility]ermat[facility]  Ann Armstrong MD  [phone]      Alanis ORTEGA Dossenback  1965    60 y.o. female     Date of Visit: 2025    Chief Complaint: f/u skin cancer, lesions  Chief Complaint   Patient presents with    Skin Exam     Last seen:  for filler    History of Present Illness:  History of Present Illness      1. Hx of NMSC - here for full skin check,   s/p Mohs for lesion on the R cheek   S/p Mohs for BCC on the frontal scalp 3-2017  L lateral lower shin - sup BCC -curettage 2020  She has had no problems with previous sites and no new concerns.    2.  History of AK's - no new concerns noted.    3. She has multiple asx pigmented lesions on the trunk and extremities, present for many years; no change in size/shape/color of any lesions; no bleeding lesions.    4. She has a few red lesions on the scalp, some near her previous BCC.    5. F/u dysplastic nevus  CENTRAL BACK- moderately Dysplastic nevus - excised .      Hx of laser x 1 for asphalt tattoo on the upper lip with improvement.  Hx of dermatitis on the chest - cleared with TAC.    Review of Systems:  Gen: Feels well, good sense of health.  Skin: No changing moles or lesions.    Past Medical History, Family History, Surgical History, Medications and Allergies reviewed.    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

## 2025-06-23 ASSESSMENT — PATIENT HEALTH QUESTIONNAIRE - PHQ9
9. THOUGHTS THAT YOU WOULD BE BETTER OFF DEAD, OR OF HURTING YOURSELF: NOT AT ALL
7. TROUBLE CONCENTRATING ON THINGS, SUCH AS READING THE NEWSPAPER OR WATCHING TELEVISION: NOT AT ALL
9. THOUGHTS THAT YOU WOULD BE BETTER OFF DEAD, OR OF HURTING YOURSELF: NOT AT ALL
4. FEELING TIRED OR HAVING LITTLE ENERGY: NOT AT ALL
10. IF YOU CHECKED OFF ANY PROBLEMS, HOW DIFFICULT HAVE THESE PROBLEMS MADE IT FOR YOU TO DO YOUR WORK, TAKE CARE OF THINGS AT HOME, OR GET ALONG WITH OTHER PEOPLE: NOT DIFFICULT AT ALL
6. FEELING BAD ABOUT YOURSELF - OR THAT YOU ARE A FAILURE OR HAVE LET YOURSELF OR YOUR FAMILY DOWN: NOT AT ALL
SUM OF ALL RESPONSES TO PHQ QUESTIONS 1-9: 0
4. FEELING TIRED OR HAVING LITTLE ENERGY: NOT AT ALL
7. TROUBLE CONCENTRATING ON THINGS, SUCH AS READING THE NEWSPAPER OR WATCHING TELEVISION: NOT AT ALL
2. FEELING DOWN, DEPRESSED OR HOPELESS: NOT AT ALL
SUM OF ALL RESPONSES TO PHQ QUESTIONS 1-9: 0
5. POOR APPETITE OR OVEREATING: NOT AT ALL
8. MOVING OR SPEAKING SO SLOWLY THAT OTHER PEOPLE COULD HAVE NOTICED. OR THE OPPOSITE - BEING SO FIDGETY OR RESTLESS THAT YOU HAVE BEEN MOVING AROUND A LOT MORE THAN USUAL: NOT AT ALL
1. LITTLE INTEREST OR PLEASURE IN DOING THINGS: NOT AT ALL
SUM OF ALL RESPONSES TO PHQ QUESTIONS 1-9: 0
6. FEELING BAD ABOUT YOURSELF - OR THAT YOU ARE A FAILURE OR HAVE LET YOURSELF OR YOUR FAMILY DOWN: NOT AT ALL
10. IF YOU CHECKED OFF ANY PROBLEMS, HOW DIFFICULT HAVE THESE PROBLEMS MADE IT FOR YOU TO DO YOUR WORK, TAKE CARE OF THINGS AT HOME, OR GET ALONG WITH OTHER PEOPLE: NOT DIFFICULT AT ALL
3. TROUBLE FALLING OR STAYING ASLEEP: NOT AT ALL
5. POOR APPETITE OR OVEREATING: NOT AT ALL
8. MOVING OR SPEAKING SO SLOWLY THAT OTHER PEOPLE COULD HAVE NOTICED. OR THE OPPOSITE, BEING SO FIGETY OR RESTLESS THAT YOU HAVE BEEN MOVING AROUND A LOT MORE THAN USUAL: NOT AT ALL
SUM OF ALL RESPONSES TO PHQ QUESTIONS 1-9: 0
1. LITTLE INTEREST OR PLEASURE IN DOING THINGS: NOT AT ALL
SUM OF ALL RESPONSES TO PHQ QUESTIONS 1-9: 0
3. TROUBLE FALLING OR STAYING ASLEEP: NOT AT ALL
2. FEELING DOWN, DEPRESSED OR HOPELESS: NOT AT ALL

## 2025-06-25 ENCOUNTER — OFFICE VISIT (OUTPATIENT)
Dept: INTERNAL MEDICINE CLINIC | Age: 60
End: 2025-06-25
Payer: COMMERCIAL

## 2025-06-25 ENCOUNTER — RESULTS FOLLOW-UP (OUTPATIENT)
Dept: INTERNAL MEDICINE CLINIC | Age: 60
End: 2025-06-25

## 2025-06-25 VITALS
HEART RATE: 75 BPM | OXYGEN SATURATION: 99 % | SYSTOLIC BLOOD PRESSURE: 120 MMHG | DIASTOLIC BLOOD PRESSURE: 72 MMHG | HEIGHT: 62 IN | WEIGHT: 136.2 LBS | BODY MASS INDEX: 25.06 KG/M2

## 2025-06-25 DIAGNOSIS — E78.49 OTHER HYPERLIPIDEMIA: ICD-10-CM

## 2025-06-25 DIAGNOSIS — Z00.00 ANNUAL PHYSICAL EXAM: ICD-10-CM

## 2025-06-25 DIAGNOSIS — Z00.00 ANNUAL PHYSICAL EXAM: Primary | ICD-10-CM

## 2025-06-25 DIAGNOSIS — B00.1 COLD SORE: ICD-10-CM

## 2025-06-25 DIAGNOSIS — J30.89 SEASONAL ALLERGIC RHINITIS DUE TO OTHER ALLERGIC TRIGGER: ICD-10-CM

## 2025-06-25 DIAGNOSIS — Z12.31 ENCOUNTER FOR SCREENING MAMMOGRAM FOR MALIGNANT NEOPLASM OF BREAST: ICD-10-CM

## 2025-06-25 LAB
ALBUMIN SERPL-MCNC: 4.3 G/DL (ref 3.4–5)
ALBUMIN/GLOB SERPL: 2 {RATIO} (ref 1.1–2.2)
ALP SERPL-CCNC: 89 U/L (ref 40–129)
ALT SERPL-CCNC: 15 U/L (ref 10–40)
ANION GAP SERPL CALCULATED.3IONS-SCNC: 10 MMOL/L (ref 3–16)
AST SERPL-CCNC: 21 U/L (ref 15–37)
BASOPHILS # BLD: 0 K/UL (ref 0–0.2)
BASOPHILS NFR BLD: 0.8 %
BILIRUB SERPL-MCNC: <0.2 MG/DL (ref 0–1)
BUN SERPL-MCNC: 15 MG/DL (ref 7–20)
CALCIUM SERPL-MCNC: 9.3 MG/DL (ref 8.3–10.6)
CHLORIDE SERPL-SCNC: 105 MMOL/L (ref 99–110)
CHOLEST SERPL-MCNC: 229 MG/DL (ref 0–199)
CO2 SERPL-SCNC: 26 MMOL/L (ref 21–32)
CREAT SERPL-MCNC: 0.7 MG/DL (ref 0.6–1.2)
DEPRECATED RDW RBC AUTO: 13.3 % (ref 12.4–15.4)
EOSINOPHIL # BLD: 0.1 K/UL (ref 0–0.6)
EOSINOPHIL NFR BLD: 2.9 %
GFR SERPLBLD CREATININE-BSD FMLA CKD-EPI: >90 ML/MIN/{1.73_M2}
GLUCOSE SERPL-MCNC: 95 MG/DL (ref 70–99)
HCT VFR BLD AUTO: 38.5 % (ref 36–48)
HDLC SERPL-MCNC: 54 MG/DL (ref 40–60)
HGB BLD-MCNC: 13.3 G/DL (ref 12–16)
LDL CHOLESTEROL: 154 MG/DL
LYMPHOCYTES # BLD: 1.4 K/UL (ref 1–5.1)
LYMPHOCYTES NFR BLD: 28.4 %
MCH RBC QN AUTO: 30.4 PG (ref 26–34)
MCHC RBC AUTO-ENTMCNC: 34.5 G/DL (ref 31–36)
MCV RBC AUTO: 88.1 FL (ref 80–100)
MONOCYTES # BLD: 0.4 K/UL (ref 0–1.3)
MONOCYTES NFR BLD: 7.9 %
NEUTROPHILS # BLD: 2.9 K/UL (ref 1.7–7.7)
NEUTROPHILS NFR BLD: 60 %
PLATELET # BLD AUTO: 237 K/UL (ref 135–450)
PMV BLD AUTO: 7.5 FL (ref 5–10.5)
POTASSIUM SERPL-SCNC: 4.4 MMOL/L (ref 3.5–5.1)
PROT SERPL-MCNC: 6.5 G/DL (ref 6.4–8.2)
RBC # BLD AUTO: 4.37 M/UL (ref 4–5.2)
SODIUM SERPL-SCNC: 141 MMOL/L (ref 136–145)
TRIGL SERPL-MCNC: 106 MG/DL (ref 0–150)
VLDLC SERPL CALC-MCNC: 21 MG/DL
WBC # BLD AUTO: 4.9 K/UL (ref 4–11)

## 2025-06-25 PROCEDURE — 99396 PREV VISIT EST AGE 40-64: CPT | Performed by: NURSE PRACTITIONER

## 2025-06-25 SDOH — ECONOMIC STABILITY: FOOD INSECURITY: WITHIN THE PAST 12 MONTHS, YOU WORRIED THAT YOUR FOOD WOULD RUN OUT BEFORE YOU GOT MONEY TO BUY MORE.: NEVER TRUE

## 2025-06-25 SDOH — ECONOMIC STABILITY: FOOD INSECURITY: WITHIN THE PAST 12 MONTHS, THE FOOD YOU BOUGHT JUST DIDN'T LAST AND YOU DIDN'T HAVE MONEY TO GET MORE.: NEVER TRUE

## 2025-06-25 ASSESSMENT — ENCOUNTER SYMPTOMS
SHORTNESS OF BREATH: 0
VOMITING: 0
NAUSEA: 0
COUGH: 0
DIARRHEA: 0
CONSTIPATION: 0
WHEEZING: 0
ABDOMINAL PAIN: 0

## 2025-06-25 NOTE — PROGRESS NOTES
Annual Physical Office Visit   6/25/2025    Subjective:  Chief Complaint   Patient presents with    Annual Exam     HPI:   Alanis Darby is a 60 y.o. female.    History of Present Illness  The patient presents for an annual physical, evaluation of allergies, and elevated cholesterol.    Allergic rhinitis- using flonase as needed with relief. Does intermittently take Claritin.  She reports experiencing allergies, which she attributes to her relocation to [facility]. She notes that her symptoms are exacerbated after mowing the lawn.    Hyperlipidemia - walking for exercise. Active at work. Diet is reported as healthy. She maintains a healthy lifestyle, including regular exercise and a high-protein diet.    Cold sores-She uses Valtrex as needed for cold sores with relief, prescribed by her dermatologist.    History of basal cell cancer - follows with dermatology yearly.    Denies acute concerns.    She is up to date on her Pap smear, which was done on 12/31/2024. She plans to schedule a mammogram today. She reports no new diagnoses or surgical procedures since her last visit. She reports no fevers, chills, cough, shortness of breath, trouble breathing, nausea, vomiting, urinary problems, abnormal vaginal discharge or bleeding.     FAMILY HISTORY  The patient's daughter had gestational diabetes.    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 Working at the [employer] during the day. 3 daughters. 6 grandchildren + 2 \"bonus.\" . For fun, she enjoys riding a motorcycle. Lives in [place].     Review of Systems   Constitutional:  Negative for chills, fatigue and fever.   Respiratory:  Negative for cough, shortness of breath and wheezing.    Cardiovascular:  Negative for chest pain, palpitations and leg swelling.   Gastrointestinal:  Negative for abdominal pain, constipation, diarrhea, nausea and vomiting.   Skin:  Negative for pallor and rash.   Neurological:  Negative for dizziness,

## 2025-06-26 ENCOUNTER — HOSPITAL ENCOUNTER (OUTPATIENT)
Dept: WOMENS IMAGING | Age: 60
Discharge: HOME | End: 2025-06-26
Payer: COMMERCIAL

## 2025-06-26 VITALS — WEIGHT: 137 LBS | HEIGHT: 62 IN | BODY MASS INDEX: 25.21 KG/M2

## 2025-06-26 DIAGNOSIS — Z12.31 ENCOUNTER FOR SCREENING MAMMOGRAM FOR MALIGNANT NEOPLASM OF BREAST: ICD-10-CM

## 2025-06-26 PROCEDURE — 77067 SCR MAMMO BI INCL CAD: CPT

## 2025-06-27 ENCOUNTER — TELEPHONE (OUTPATIENT)
Dept: WOMENS IMAGING | Age: 60
End: 2025-06-27

## 2025-06-27 ENCOUNTER — RESULTS FOLLOW-UP (OUTPATIENT)
Dept: INTERNAL MEDICINE CLINIC | Age: 60
End: 2025-06-27

## 2025-06-27 DIAGNOSIS — R92.8 ABNORMAL MAMMOGRAM: Primary | ICD-10-CM

## 2025-07-01 ENCOUNTER — RESULTS FOLLOW-UP (OUTPATIENT)
Dept: INTERNAL MEDICINE CLINIC | Age: 60
End: 2025-07-01

## 2025-07-01 ENCOUNTER — HOSPITAL ENCOUNTER (OUTPATIENT)
Dept: WOMENS IMAGING | Age: 60
Discharge: HOME | End: 2025-07-01
Payer: COMMERCIAL

## 2025-07-01 ENCOUNTER — HOSPITAL ENCOUNTER (OUTPATIENT)
Dept: ULTRASOUND IMAGING | Age: 60
Discharge: HOME | End: 2025-07-01
Payer: COMMERCIAL

## 2025-07-01 DIAGNOSIS — R92.8 ABNORMAL MAMMOGRAM: ICD-10-CM

## 2025-07-01 PROCEDURE — 76642 ULTRASOUND BREAST LIMITED: CPT

## 2025-07-01 PROCEDURE — 77065 DX MAMMO INCL CAD UNI: CPT
